# Patient Record
Sex: MALE | Race: BLACK OR AFRICAN AMERICAN | NOT HISPANIC OR LATINO | URBAN - METROPOLITAN AREA
[De-identification: names, ages, dates, MRNs, and addresses within clinical notes are randomized per-mention and may not be internally consistent; named-entity substitution may affect disease eponyms.]

---

## 2025-01-01 ENCOUNTER — APPOINTMENT (OUTPATIENT)
Dept: RADIOLOGY | Facility: HOSPITAL | Age: 75
End: 2025-01-01
Payer: MEDICARE

## 2025-01-01 ENCOUNTER — CLINICAL SUPPORT (OUTPATIENT)
Dept: EMERGENCY MEDICINE | Facility: HOSPITAL | Age: 75
End: 2025-01-01
Payer: MEDICARE

## 2025-01-01 ENCOUNTER — HOSPITAL ENCOUNTER (INPATIENT)
Facility: HOSPITAL | Age: 75
LOS: 5 days | End: 2025-05-03
Attending: EMERGENCY MEDICINE | Admitting: PEDIATRICS
Payer: MEDICARE

## 2025-01-01 ENCOUNTER — APPOINTMENT (OUTPATIENT)
Dept: CARDIOLOGY | Facility: HOSPITAL | Age: 75
End: 2025-01-01
Payer: MEDICARE

## 2025-01-01 ENCOUNTER — APPOINTMENT (OUTPATIENT)
Dept: NEUROLOGY | Facility: HOSPITAL | Age: 75
End: 2025-01-01
Payer: MEDICARE

## 2025-01-01 VITALS
WEIGHT: 124.12 LBS | OXYGEN SATURATION: 100 % | SYSTOLIC BLOOD PRESSURE: 101 MMHG | HEIGHT: 69 IN | RESPIRATION RATE: 7 BRPM | BODY MASS INDEX: 18.38 KG/M2 | HEART RATE: 81 BPM | DIASTOLIC BLOOD PRESSURE: 48 MMHG | TEMPERATURE: 95.9 F

## 2025-01-01 DIAGNOSIS — I46.9 CARDIAC ARREST: Primary | ICD-10-CM

## 2025-01-01 LAB
ABO GROUP (TYPE) IN BLOOD: NORMAL
ABO GROUP (TYPE) IN BLOOD: NORMAL
ACANTHOCYTES BLD QL SMEAR: ABNORMAL
ACANTHOCYTES BLD QL SMEAR: NORMAL
ACID FAST STN SPEC: NORMAL
ALBUMIN SERPL BCP-MCNC: 1.8 G/DL (ref 3.4–5)
ALBUMIN SERPL BCP-MCNC: 2 G/DL (ref 3.4–5)
ALBUMIN SERPL BCP-MCNC: 2.1 G/DL (ref 3.4–5)
ALBUMIN SERPL BCP-MCNC: 2.2 G/DL (ref 3.4–5)
ALBUMIN SERPL BCP-MCNC: 2.2 G/DL (ref 3.4–5)
ALBUMIN SERPL BCP-MCNC: 2.4 G/DL (ref 3.4–5)
ALBUMIN SERPL BCP-MCNC: 2.6 G/DL (ref 3.4–5)
ALP SERPL-CCNC: 49 U/L (ref 33–136)
ALP SERPL-CCNC: 74 U/L (ref 33–136)
ALT SERPL W P-5'-P-CCNC: 120 U/L (ref 10–52)
ALT SERPL W P-5'-P-CCNC: 24 U/L (ref 10–52)
AMPHETAMINES UR QL SCN: ABNORMAL
ANION GAP BLDA CALCULATED.4IONS-SCNC: 10 MMO/L (ref 10–25)
ANION GAP BLDA CALCULATED.4IONS-SCNC: 11 MMO/L (ref 10–25)
ANION GAP BLDA CALCULATED.4IONS-SCNC: 12 MMO/L (ref 10–25)
ANION GAP BLDA CALCULATED.4IONS-SCNC: 13 MMO/L (ref 10–25)
ANION GAP BLDA CALCULATED.4IONS-SCNC: 13 MMO/L (ref 10–25)
ANION GAP BLDA CALCULATED.4IONS-SCNC: 18 MMO/L (ref 10–25)
ANION GAP BLDV CALCULATED.4IONS-SCNC: 11 MMOL/L (ref 10–25)
ANION GAP BLDV CALCULATED.4IONS-SCNC: 17 MMOL/L (ref 10–25)
ANION GAP BLDV CALCULATED.4IONS-SCNC: 19 MMOL/L (ref 10–25)
ANION GAP BLDV CALCULATED.4IONS-SCNC: 9 MMOL/L (ref 10–25)
ANION GAP SERPL CALC-SCNC: 12 MMOL/L (ref 10–20)
ANION GAP SERPL CALC-SCNC: 13 MMOL/L
ANION GAP SERPL CALC-SCNC: 13 MMOL/L (ref 10–20)
ANION GAP SERPL CALC-SCNC: 14 MMOL/L
ANION GAP SERPL CALC-SCNC: 14 MMOL/L (ref 10–20)
ANION GAP SERPL CALC-SCNC: 15 MMOL/L (ref 10–20)
ANION GAP SERPL CALC-SCNC: 19 MMOL/L (ref 10–20)
ANION GAP SERPL CALC-SCNC: 26 MMOL/L (ref 10–20)
ANTIBODY SCREEN: NORMAL
ANTIBODY SCREEN: NORMAL
APPEARANCE UR: ABNORMAL
APTT PPP: 31 SECONDS (ref 26–36)
APTT PPP: 50 SECONDS (ref 26–36)
AST SERPL W P-5'-P-CCNC: 36 U/L (ref 9–39)
AST SERPL W P-5'-P-CCNC: 405 U/L (ref 9–39)
ATRIAL RATE: 103 BPM
BACTERIA BLD CULT: NORMAL
BACTERIA BLD CULT: NORMAL
BACTERIA SPEC RESP CULT: ABNORMAL
BACTERIA UR CULT: NO GROWTH
BARBITURATES UR QL SCN: ABNORMAL
BASE EXCESS BLDA CALC-SCNC: -0.7 MMOL/L (ref -2–3)
BASE EXCESS BLDA CALC-SCNC: -2.7 MMOL/L (ref -2–3)
BASE EXCESS BLDA CALC-SCNC: -3.2 MMOL/L (ref -2–3)
BASE EXCESS BLDA CALC-SCNC: -5 MMOL/L (ref -2–3)
BASE EXCESS BLDA CALC-SCNC: -5.7 MMOL/L (ref -2–3)
BASE EXCESS BLDA CALC-SCNC: -5.7 MMOL/L (ref -2–3)
BASE EXCESS BLDA CALC-SCNC: -7.1 MMOL/L (ref -2–3)
BASE EXCESS BLDA CALC-SCNC: -8.4 MMOL/L (ref -2–3)
BASE EXCESS BLDV CALC-SCNC: -2 MMOL/L (ref -2–3)
BASE EXCESS BLDV CALC-SCNC: -3.9 MMOL/L (ref -2–3)
BASE EXCESS BLDV CALC-SCNC: -7 MMOL/L (ref -2–3)
BASE EXCESS BLDV CALC-SCNC: -7.6 MMOL/L (ref -2–3)
BASO STIPL BLD QL SMEAR: PRESENT
BASO STIPL BLD QL SMEAR: PRESENT
BASOPHILS # BLD AUTO: 0.01 X10*3/UL (ref 0–0.1)
BASOPHILS # BLD AUTO: 0.02 X10*3/UL (ref 0–0.1)
BASOPHILS # BLD AUTO: 0.06 X10*3/UL (ref 0–0.1)
BASOPHILS # BLD MANUAL: 0 X10*3/UL (ref 0–0.1)
BASOPHILS NFR BLD AUTO: 0.2 %
BASOPHILS NFR BLD AUTO: 0.2 %
BASOPHILS NFR BLD AUTO: 0.4 %
BASOPHILS NFR BLD MANUAL: 0 %
BENZODIAZ UR QL SCN: ABNORMAL
BILIRUB DIRECT SERPL-MCNC: 0.4 MG/DL (ref 0–0.3)
BILIRUB SERPL-MCNC: 0.8 MG/DL (ref 0–1.2)
BILIRUB SERPL-MCNC: 1.3 MG/DL (ref 0–1.2)
BILIRUB UR STRIP.AUTO-MCNC: NEGATIVE MG/DL
BLASTS # BLD MANUAL: 0 X10*3/UL
BLASTS NFR BLD MANUAL: 0 %
BLOOD EXPIRATION DATE: NORMAL
BODY TEMPERATURE: 37 DEGREES CELSIUS
BUN SERPL-MCNC: 18 MG/DL (ref 6–23)
BUN SERPL-MCNC: 20 MG/DL (ref 6–23)
BUN SERPL-MCNC: 21 MG/DL (ref 6–23)
BUN SERPL-MCNC: 21 MG/DL (ref 6–23)
BUN SERPL-MCNC: 22 MG/DL (ref 6–23)
BUN SERPL-MCNC: 22 MG/DL (ref 6–23)
BUN SERPL-MCNC: 23 MG/DL (ref 6–23)
BUN SERPL-MCNC: 25 MG/DL (ref 6–23)
BUN SERPL-MCNC: 28 MG/DL (ref 6–23)
BUN SERPL-MCNC: 29 MG/DL (ref 6–23)
BURR CELLS BLD QL SMEAR: ABNORMAL
BURR CELLS BLD QL SMEAR: NORMAL
BZE UR QL SCN: ABNORMAL
CA-I BLDA-SCNC: 0.96 MMOL/L (ref 1.1–1.33)
CA-I BLDA-SCNC: 1.01 MMOL/L (ref 1.1–1.33)
CA-I BLDA-SCNC: 1.02 MMOL/L (ref 1.1–1.33)
CA-I BLDA-SCNC: 1.03 MMOL/L (ref 1.1–1.33)
CA-I BLDA-SCNC: 1.16 MMOL/L (ref 1.1–1.33)
CA-I BLDA-SCNC: 1.23 MMOL/L (ref 1.1–1.33)
CA-I BLDA-SCNC: 1.27 MMOL/L (ref 1.1–1.33)
CA-I BLDA-SCNC: 1.35 MMOL/L (ref 1.1–1.33)
CA-I BLDV-SCNC: 1.11 MMOL/L (ref 1.1–1.33)
CA-I BLDV-SCNC: 1.15 MMOL/L (ref 1.1–1.33)
CA-I BLDV-SCNC: 1.24 MMOL/L (ref 1.1–1.33)
CA-I BLDV-SCNC: 1.54 MMOL/L (ref 1.1–1.33)
CALCIUM SERPL-MCNC: 11.5 MG/DL (ref 8.6–10.6)
CALCIUM SERPL-MCNC: 5.9 MG/DL (ref 8.6–10.6)
CALCIUM SERPL-MCNC: 6.1 MG/DL (ref 8.6–10.6)
CALCIUM SERPL-MCNC: 6.5 MG/DL (ref 8.6–10.6)
CALCIUM SERPL-MCNC: 6.9 MG/DL (ref 8.6–10.6)
CALCIUM SERPL-MCNC: 7 MG/DL (ref 8.6–10.6)
CALCIUM SERPL-MCNC: 7.1 MG/DL (ref 8.6–10.6)
CALCIUM SERPL-MCNC: 7.5 MG/DL (ref 8.6–10.6)
CALCIUM SERPL-MCNC: 8 MG/DL (ref 8.6–10.6)
CALCIUM SERPL-MCNC: 9 MG/DL (ref 8.6–10.6)
CANNABINOIDS UR QL SCN: ABNORMAL
CARDIAC TROPONIN I PNL SERPL HS: 149 NG/L (ref 0–53)
CARDIAC TROPONIN I PNL SERPL HS: 236 NG/L (ref 0–53)
CARDIAC TROPONIN I PNL SERPL HS: 334 NG/L (ref 0–53)
CARDIAC TROPONIN I PNL SERPL HS: 362 NG/L (ref 0–53)
CARDIAC TROPONIN I PNL SERPL HS: 88 NG/L (ref 0–53)
CHLORIDE BLDA-SCNC: 100 MMOL/L (ref 98–107)
CHLORIDE BLDA-SCNC: 95 MMOL/L (ref 98–107)
CHLORIDE BLDA-SCNC: 97 MMOL/L (ref 98–107)
CHLORIDE BLDA-SCNC: 97 MMOL/L (ref 98–107)
CHLORIDE BLDA-SCNC: 99 MMOL/L (ref 98–107)
CHLORIDE BLDV-SCNC: 103 MMOL/L (ref 98–107)
CHLORIDE BLDV-SCNC: 94 MMOL/L (ref 98–107)
CHLORIDE BLDV-SCNC: 98 MMOL/L (ref 98–107)
CHLORIDE BLDV-SCNC: 98 MMOL/L (ref 98–107)
CHLORIDE SERPL-SCNC: 100 MMOL/L (ref 98–107)
CHLORIDE SERPL-SCNC: 100 MMOL/L (ref 98–107)
CHLORIDE SERPL-SCNC: 101 MMOL/L (ref 98–107)
CHLORIDE SERPL-SCNC: 102 MMOL/L (ref 98–107)
CHLORIDE SERPL-SCNC: 94 MMOL/L (ref 98–107)
CHLORIDE SERPL-SCNC: 95 MMOL/L (ref 98–107)
CHLORIDE SERPL-SCNC: 96 MMOL/L (ref 98–107)
CHLORIDE SERPL-SCNC: 97 MMOL/L (ref 98–107)
CHLORIDE SERPL-SCNC: 98 MMOL/L (ref 98–107)
CHLORIDE SERPL-SCNC: 99 MMOL/L (ref 98–107)
CHLORIDE UR-SCNC: 46 MMOL/L
CHLORIDE UR-SCNC: 46 MMOL/L
CHLORIDE/CREATININE (MMOL/G) IN URINE: 144 MMOL/G CREAT (ref 23–275)
CHLORIDE/CREATININE (MMOL/G) IN URINE: 160 MMOL/G CREAT (ref 23–275)
CK SERPL-CCNC: 601 U/L (ref 0–325)
CO2 SERPL-SCNC: 16 MMOL/L (ref 21–32)
CO2 SERPL-SCNC: 16 MMOL/L (ref 21–32)
CO2 SERPL-SCNC: 17 MMOL/L (ref 21–32)
CO2 SERPL-SCNC: 18 MMOL/L (ref 21–32)
CO2 SERPL-SCNC: 18 MMOL/L (ref 21–32)
CO2 SERPL-SCNC: 19 MMOL/L (ref 21–32)
CO2 SERPL-SCNC: 20 MMOL/L (ref 21–32)
CO2 SERPL-SCNC: 22 MMOL/L (ref 21–32)
COLOR UR: COLORLESS
CORTIS SERPL-MCNC: 130.2 UG/DL (ref 2.5–20)
CREAT SERPL-MCNC: 0.68 MG/DL (ref 0.5–1.3)
CREAT SERPL-MCNC: 0.82 MG/DL (ref 0.5–1.3)
CREAT SERPL-MCNC: 0.89 MG/DL (ref 0.5–1.3)
CREAT SERPL-MCNC: 1.06 MG/DL (ref 0.5–1.3)
CREAT SERPL-MCNC: 1.09 MG/DL (ref 0.5–1.3)
CREAT SERPL-MCNC: 1.19 MG/DL (ref 0.5–1.3)
CREAT SERPL-MCNC: 1.24 MG/DL (ref 0.5–1.3)
CREAT SERPL-MCNC: 1.35 MG/DL (ref 0.5–1.3)
CREAT SERPL-MCNC: 1.66 MG/DL (ref 0.5–1.3)
CREAT SERPL-MCNC: 1.78 MG/DL (ref 0.5–1.3)
CREAT UR-MCNC: 28.8 MG/DL (ref 20–370)
CREAT UR-MCNC: 31.9 MG/DL (ref 20–370)
DISPENSE STATUS: NORMAL
EGFRCR SERPLBLD CKD-EPI 2021: 39 ML/MIN/1.73M*2
EGFRCR SERPLBLD CKD-EPI 2021: 42 ML/MIN/1.73M*2
EGFRCR SERPLBLD CKD-EPI 2021: 54 ML/MIN/1.73M*2
EGFRCR SERPLBLD CKD-EPI 2021: 60 ML/MIN/1.73M*2
EGFRCR SERPLBLD CKD-EPI 2021: 63 ML/MIN/1.73M*2
EGFRCR SERPLBLD CKD-EPI 2021: 70 ML/MIN/1.73M*2
EGFRCR SERPLBLD CKD-EPI 2021: 73 ML/MIN/1.73M*2
EGFRCR SERPLBLD CKD-EPI 2021: 89 ML/MIN/1.73M*2
EGFRCR SERPLBLD CKD-EPI 2021: >90 ML/MIN/1.73M*2
EGFRCR SERPLBLD CKD-EPI 2021: >90 ML/MIN/1.73M*2
EOSINOPHIL # BLD AUTO: 0 X10*3/UL (ref 0–0.4)
EOSINOPHIL # BLD AUTO: 0.01 X10*3/UL (ref 0–0.4)
EOSINOPHIL # BLD AUTO: 0.01 X10*3/UL (ref 0–0.4)
EOSINOPHIL # BLD MANUAL: 0 X10*3/UL (ref 0–0.4)
EOSINOPHIL # BLD MANUAL: 0 X10*3/UL (ref 0–0.4)
EOSINOPHIL # BLD MANUAL: 0.05 X10*3/UL (ref 0–0.4)
EOSINOPHIL # BLD MANUAL: 0.19 X10*3/UL (ref 0–0.4)
EOSINOPHIL NFR BLD AUTO: 0 %
EOSINOPHIL NFR BLD AUTO: 0.1 %
EOSINOPHIL NFR BLD AUTO: 0.1 %
EOSINOPHIL NFR BLD MANUAL: 0 %
EOSINOPHIL NFR BLD MANUAL: 0 %
EOSINOPHIL NFR BLD MANUAL: 0.4 %
EOSINOPHIL NFR BLD MANUAL: 1.5 %
ERYTHROCYTE [DISTWIDTH] IN BLOOD BY AUTOMATED COUNT: 13.4 % (ref 11.5–14.5)
ERYTHROCYTE [DISTWIDTH] IN BLOOD BY AUTOMATED COUNT: 13.9 % (ref 11.5–14.5)
ERYTHROCYTE [DISTWIDTH] IN BLOOD BY AUTOMATED COUNT: 13.9 % (ref 11.5–14.5)
ERYTHROCYTE [DISTWIDTH] IN BLOOD BY AUTOMATED COUNT: 14.2 % (ref 11.5–14.5)
ERYTHROCYTE [DISTWIDTH] IN BLOOD BY AUTOMATED COUNT: 14.5 % (ref 11.5–14.5)
ERYTHROCYTE [DISTWIDTH] IN BLOOD BY AUTOMATED COUNT: 14.6 % (ref 11.5–14.5)
ERYTHROCYTE [DISTWIDTH] IN BLOOD BY AUTOMATED COUNT: 14.7 % (ref 11.5–14.5)
FENTANYL+NORFENTANYL UR QL SCN: ABNORMAL
FIBRINOGEN PPP-MCNC: 291 MG/DL (ref 200–400)
FIBRINOGEN PPP-MCNC: 399 MG/DL (ref 200–400)
GLUCOSE BLD MANUAL STRIP-MCNC: 100 MG/DL (ref 74–99)
GLUCOSE BLD MANUAL STRIP-MCNC: 107 MG/DL (ref 74–99)
GLUCOSE BLD MANUAL STRIP-MCNC: 107 MG/DL (ref 74–99)
GLUCOSE BLD MANUAL STRIP-MCNC: 108 MG/DL (ref 74–99)
GLUCOSE BLD MANUAL STRIP-MCNC: 110 MG/DL (ref 74–99)
GLUCOSE BLD MANUAL STRIP-MCNC: 114 MG/DL (ref 74–99)
GLUCOSE BLD MANUAL STRIP-MCNC: 124 MG/DL (ref 74–99)
GLUCOSE BLD MANUAL STRIP-MCNC: 125 MG/DL (ref 74–99)
GLUCOSE BLD MANUAL STRIP-MCNC: 127 MG/DL (ref 74–99)
GLUCOSE BLD MANUAL STRIP-MCNC: 128 MG/DL (ref 74–99)
GLUCOSE BLD MANUAL STRIP-MCNC: 128 MG/DL (ref 74–99)
GLUCOSE BLD MANUAL STRIP-MCNC: 135 MG/DL (ref 74–99)
GLUCOSE BLD MANUAL STRIP-MCNC: 138 MG/DL (ref 74–99)
GLUCOSE BLD MANUAL STRIP-MCNC: 14 MG/DL (ref 74–99)
GLUCOSE BLD MANUAL STRIP-MCNC: 14 MG/DL (ref 74–99)
GLUCOSE BLD MANUAL STRIP-MCNC: 149 MG/DL (ref 74–99)
GLUCOSE BLD MANUAL STRIP-MCNC: 153 MG/DL (ref 74–99)
GLUCOSE BLD MANUAL STRIP-MCNC: 154 MG/DL (ref 74–99)
GLUCOSE BLD MANUAL STRIP-MCNC: 156 MG/DL (ref 74–99)
GLUCOSE BLD MANUAL STRIP-MCNC: 177 MG/DL (ref 74–99)
GLUCOSE BLD MANUAL STRIP-MCNC: 181 MG/DL (ref 74–99)
GLUCOSE BLD MANUAL STRIP-MCNC: 21 MG/DL (ref 74–99)
GLUCOSE BLD MANUAL STRIP-MCNC: 21 MG/DL (ref 74–99)
GLUCOSE BLD MANUAL STRIP-MCNC: 225 MG/DL (ref 74–99)
GLUCOSE BLD MANUAL STRIP-MCNC: 238 MG/DL (ref 74–99)
GLUCOSE BLD MANUAL STRIP-MCNC: 269 MG/DL (ref 74–99)
GLUCOSE BLD MANUAL STRIP-MCNC: 27 MG/DL (ref 74–99)
GLUCOSE BLD MANUAL STRIP-MCNC: 285 MG/DL (ref 74–99)
GLUCOSE BLD MANUAL STRIP-MCNC: 292 MG/DL (ref 74–99)
GLUCOSE BLD MANUAL STRIP-MCNC: 49 MG/DL (ref 74–99)
GLUCOSE BLD MANUAL STRIP-MCNC: 64 MG/DL (ref 74–99)
GLUCOSE BLD MANUAL STRIP-MCNC: 75 MG/DL (ref 74–99)
GLUCOSE BLD MANUAL STRIP-MCNC: 84 MG/DL (ref 74–99)
GLUCOSE BLD MANUAL STRIP-MCNC: 89 MG/DL (ref 74–99)
GLUCOSE BLD MANUAL STRIP-MCNC: 93 MG/DL (ref 74–99)
GLUCOSE BLD MANUAL STRIP-MCNC: <10 MG/DL (ref 74–99)
GLUCOSE BLDA-MCNC: 100 MG/DL (ref 74–99)
GLUCOSE BLDA-MCNC: 103 MG/DL (ref 74–99)
GLUCOSE BLDA-MCNC: 111 MG/DL (ref 74–99)
GLUCOSE BLDA-MCNC: 198 MG/DL (ref 74–99)
GLUCOSE BLDA-MCNC: 240 MG/DL (ref 74–99)
GLUCOSE BLDA-MCNC: 269 MG/DL (ref 74–99)
GLUCOSE BLDA-MCNC: 301 MG/DL (ref 74–99)
GLUCOSE BLDA-MCNC: 88 MG/DL (ref 74–99)
GLUCOSE BLDV-MCNC: 118 MG/DL (ref 74–99)
GLUCOSE BLDV-MCNC: 127 MG/DL (ref 74–99)
GLUCOSE BLDV-MCNC: 171 MG/DL (ref 74–99)
GLUCOSE BLDV-MCNC: 263 MG/DL (ref 74–99)
GLUCOSE SERPL-MCNC: 100 MG/DL (ref 74–99)
GLUCOSE SERPL-MCNC: 101 MG/DL (ref 74–99)
GLUCOSE SERPL-MCNC: 112 MG/DL (ref 74–99)
GLUCOSE SERPL-MCNC: 155 MG/DL (ref 74–99)
GLUCOSE SERPL-MCNC: 170 MG/DL (ref 74–99)
GLUCOSE SERPL-MCNC: 225 MG/DL (ref 74–99)
GLUCOSE SERPL-MCNC: 235 MG/DL (ref 74–99)
GLUCOSE SERPL-MCNC: 250 MG/DL (ref 74–99)
GLUCOSE SERPL-MCNC: 81 MG/DL (ref 74–99)
GLUCOSE SERPL-MCNC: 92 MG/DL (ref 74–99)
GLUCOSE UR STRIP.AUTO-MCNC: ABNORMAL MG/DL
GRAM STN SPEC: ABNORMAL
GRAM STN SPEC: ABNORMAL
HAPTOGLOB SERPL NEPH-MCNC: 103 MG/DL (ref 30–200)
HAPTOGLOB SERPL NEPH-MCNC: 119 MG/DL (ref 30–200)
HAPTOGLOB SERPL NEPH-MCNC: 119 MG/DL (ref 30–200)
HCO3 BLDA-SCNC: 14.9 MMOL/L (ref 22–26)
HCO3 BLDA-SCNC: 16.2 MMOL/L (ref 22–26)
HCO3 BLDA-SCNC: 17.1 MMOL/L (ref 22–26)
HCO3 BLDA-SCNC: 18 MMOL/L (ref 22–26)
HCO3 BLDA-SCNC: 18 MMOL/L (ref 22–26)
HCO3 BLDA-SCNC: 18.4 MMOL/L (ref 22–26)
HCO3 BLDA-SCNC: 19.5 MMOL/L (ref 22–26)
HCO3 BLDA-SCNC: 20.9 MMOL/L (ref 22–26)
HCO3 BLDV-SCNC: 18.6 MMOL/L (ref 22–26)
HCO3 BLDV-SCNC: 20.6 MMOL/L (ref 22–26)
HCO3 BLDV-SCNC: 21.7 MMOL/L (ref 22–26)
HCO3 BLDV-SCNC: 23.3 MMOL/L (ref 22–26)
HCT VFR BLD AUTO: 18.7 % (ref 41–52)
HCT VFR BLD AUTO: 22.8 % (ref 41–52)
HCT VFR BLD AUTO: 24.2 % (ref 41–52)
HCT VFR BLD AUTO: 28.5 % (ref 41–52)
HCT VFR BLD AUTO: 28.7 % (ref 41–52)
HCT VFR BLD AUTO: 31.1 % (ref 41–52)
HCT VFR BLD AUTO: 31.3 % (ref 41–52)
HCT VFR BLD EST: 26 % (ref 41–52)
HCT VFR BLD EST: 26 % (ref 41–52)
HCT VFR BLD EST: 28 % (ref 41–52)
HCT VFR BLD EST: 28 % (ref 41–52)
HCT VFR BLD EST: 29 % (ref 41–52)
HCT VFR BLD EST: 29 % (ref 41–52)
HCT VFR BLD EST: 32 % (ref 41–52)
HCT VFR BLD EST: 32 % (ref 41–52)
HCT VFR BLD EST: 33 % (ref 41–52)
HCT VFR BLD EST: 34 % (ref 41–52)
HCT VFR BLD EST: 34 % (ref 41–52)
HCT VFR BLD EST: 38 % (ref 41–52)
HGB BLD-MCNC: 10.3 G/DL (ref 13.5–17.5)
HGB BLD-MCNC: 10.4 G/DL (ref 13.5–17.5)
HGB BLD-MCNC: 10.5 G/DL (ref 13.5–17.5)
HGB BLD-MCNC: 11.4 G/DL (ref 13.5–17.5)
HGB BLD-MCNC: 6.7 G/DL (ref 13.5–17.5)
HGB BLD-MCNC: 8.4 G/DL (ref 13.5–17.5)
HGB BLD-MCNC: 9 G/DL (ref 13.5–17.5)
HGB BLDA-MCNC: 10.5 G/DL (ref 13.5–17.5)
HGB BLDA-MCNC: 10.6 G/DL (ref 13.5–17.5)
HGB BLDA-MCNC: 11 G/DL (ref 13.5–17.5)
HGB BLDA-MCNC: 11.2 G/DL (ref 13.5–17.5)
HGB BLDA-MCNC: 11.4 G/DL (ref 13.5–17.5)
HGB BLDA-MCNC: 9.2 G/DL (ref 13.5–17.5)
HGB BLDA-MCNC: 9.4 G/DL (ref 13.5–17.5)
HGB BLDA-MCNC: 9.8 G/DL (ref 13.5–17.5)
HGB BLDV-MCNC: 12.7 G/DL (ref 13.5–17.5)
HGB BLDV-MCNC: 8.7 G/DL (ref 13.5–17.5)
HGB BLDV-MCNC: 8.7 G/DL (ref 13.5–17.5)
HGB BLDV-MCNC: 9.5 G/DL (ref 13.5–17.5)
HGB RETIC QN: 34 PG (ref 28–38)
HOLD SPECIMEN: NORMAL
HYALINE CASTS #/AREA URNS AUTO: ABNORMAL /LPF
IMM GRANULOCYTES # BLD AUTO: 0.03 X10*3/UL (ref 0–0.5)
IMM GRANULOCYTES # BLD AUTO: 0.05 X10*3/UL (ref 0–0.5)
IMM GRANULOCYTES # BLD AUTO: 0.09 X10*3/UL (ref 0–0.5)
IMM GRANULOCYTES # BLD AUTO: 0.09 X10*3/UL (ref 0–0.5)
IMM GRANULOCYTES # BLD AUTO: 0.11 X10*3/UL (ref 0–0.5)
IMM GRANULOCYTES # BLD AUTO: 0.13 X10*3/UL (ref 0–0.5)
IMM GRANULOCYTES # BLD AUTO: 0.17 X10*3/UL (ref 0–0.5)
IMM GRANULOCYTES NFR BLD AUTO: 0.4 % (ref 0–0.9)
IMM GRANULOCYTES NFR BLD AUTO: 0.6 % (ref 0–0.9)
IMM GRANULOCYTES NFR BLD AUTO: 0.7 % (ref 0–0.9)
IMM GRANULOCYTES NFR BLD AUTO: 0.8 % (ref 0–0.9)
IMM GRANULOCYTES NFR BLD AUTO: 0.9 % (ref 0–0.9)
IMM GRANULOCYTES NFR BLD AUTO: 1.1 % (ref 0–0.9)
IMM GRANULOCYTES NFR BLD AUTO: 4 % (ref 0–0.9)
IMMATURE RETIC FRACTION: 11 %
INHALED O2 CONCENTRATION: 30 %
INHALED O2 CONCENTRATION: 40 %
INR PPP: 1 (ref 0.9–1.1)
INR PPP: 1.1 (ref 0.9–1.1)
KETONES UR STRIP.AUTO-MCNC: ABNORMAL MG/DL
LACTATE BLDA-SCNC: 1.4 MMOL/L (ref 0.4–2)
LACTATE BLDA-SCNC: 1.6 MMOL/L (ref 0.4–2)
LACTATE BLDA-SCNC: 2.3 MMOL/L (ref 0.4–2)
LACTATE BLDA-SCNC: 2.4 MMOL/L (ref 0.4–2)
LACTATE BLDA-SCNC: 3.2 MMOL/L (ref 0.4–2)
LACTATE BLDA-SCNC: 3.2 MMOL/L (ref 0.4–2)
LACTATE BLDA-SCNC: 3.4 MMOL/L (ref 0.4–2)
LACTATE BLDA-SCNC: 3.8 MMOL/L (ref 0.4–2)
LACTATE BLDV-SCNC: 3.1 MMOL/L (ref 0.4–2)
LACTATE BLDV-SCNC: 5.6 MMOL/L (ref 0.4–2)
LACTATE BLDV-SCNC: 5.9 MMOL/L (ref 0.4–2)
LACTATE BLDV-SCNC: 7.3 MMOL/L (ref 0.4–2)
LACTATE SERPL-SCNC: 6.3 MMOL/L (ref 0.4–2)
LACTATE SERPL-SCNC: 7.5 MMOL/L (ref 0.4–2)
LDH SERPL L TO P-CCNC: 267 U/L (ref 84–246)
LDH SERPL L TO P-CCNC: 280 U/L (ref 84–246)
LEFT VENTRICLE INTERNAL DIMENSION DIASTOLE: 3.8 CM (ref 3.5–6)
LEFT VENTRICULAR OUTFLOW TRACT DIAMETER: 1.86 CM
LEGIONELLA AG UR QL: NEGATIVE
LEUKOCYTE ESTERASE UR QL STRIP.AUTO: ABNORMAL
LEVETIRACETAM SERPL-MCNC: 83 UG/ML (ref 10–40)
LYMPHOCYTES # BLD AUTO: 0.61 X10*3/UL (ref 0.8–3)
LYMPHOCYTES # BLD AUTO: 0.82 X10*3/UL (ref 0.8–3)
LYMPHOCYTES # BLD AUTO: 1 X10*3/UL (ref 0.8–3)
LYMPHOCYTES # BLD MANUAL: 0.38 X10*3/UL (ref 0.8–3)
LYMPHOCYTES # BLD MANUAL: 0.69 X10*3/UL (ref 0.8–3)
LYMPHOCYTES # BLD MANUAL: 0.71 X10*3/UL (ref 0.8–3)
LYMPHOCYTES # BLD MANUAL: 0.93 X10*3/UL (ref 0.8–3)
LYMPHOCYTES NFR BLD AUTO: 14.1 %
LYMPHOCYTES NFR BLD AUTO: 5.8 %
LYMPHOCYTES NFR BLD AUTO: 8.7 %
LYMPHOCYTES NFR BLD MANUAL: 21.7 %
LYMPHOCYTES NFR BLD MANUAL: 3.1 %
LYMPHOCYTES NFR BLD MANUAL: 5.5 %
LYMPHOCYTES NFR BLD MANUAL: 6.2 %
MAGNESIUM SERPL-MCNC: 1.62 MG/DL (ref 1.6–2.4)
MAGNESIUM SERPL-MCNC: 1.85 MG/DL (ref 1.6–2.4)
MAGNESIUM SERPL-MCNC: 2.12 MG/DL (ref 1.6–2.4)
MAGNESIUM SERPL-MCNC: 2.33 MG/DL (ref 1.6–2.4)
MAGNESIUM SERPL-MCNC: 2.4 MG/DL (ref 1.6–2.4)
MCH RBC QN AUTO: 29.9 PG (ref 26–34)
MCH RBC QN AUTO: 30.1 PG (ref 26–34)
MCH RBC QN AUTO: 30.4 PG (ref 26–34)
MCH RBC QN AUTO: 30.5 PG (ref 26–34)
MCH RBC QN AUTO: 30.7 PG (ref 26–34)
MCH RBC QN AUTO: 30.8 PG (ref 26–34)
MCH RBC QN AUTO: 31.2 PG (ref 26–34)
MCHC RBC AUTO-ENTMCNC: 33.8 G/DL (ref 32–36)
MCHC RBC AUTO-ENTMCNC: 35.8 G/DL (ref 32–36)
MCHC RBC AUTO-ENTMCNC: 35.9 G/DL (ref 32–36)
MCHC RBC AUTO-ENTMCNC: 36.4 G/DL (ref 32–36)
MCHC RBC AUTO-ENTMCNC: 36.5 G/DL (ref 32–36)
MCHC RBC AUTO-ENTMCNC: 36.8 G/DL (ref 32–36)
MCHC RBC AUTO-ENTMCNC: 37.2 G/DL (ref 32–36)
MCV RBC AUTO: 82 FL (ref 80–100)
MCV RBC AUTO: 83 FL (ref 80–100)
MCV RBC AUTO: 83 FL (ref 80–100)
MCV RBC AUTO: 84 FL (ref 80–100)
MCV RBC AUTO: 85 FL (ref 80–100)
MCV RBC AUTO: 85 FL (ref 80–100)
MCV RBC AUTO: 91 FL (ref 80–100)
METAMYELOCYTES # BLD MANUAL: 0 X10*3/UL
METAMYELOCYTES # BLD MANUAL: 0.05 X10*3/UL
METAMYELOCYTES # BLD MANUAL: 0.09 X10*3/UL
METAMYELOCYTES # BLD MANUAL: 0.29 X10*3/UL
METAMYELOCYTES NFR BLD MANUAL: 0 %
METAMYELOCYTES NFR BLD MANUAL: 0.4 %
METAMYELOCYTES NFR BLD MANUAL: 0.8 %
METAMYELOCYTES NFR BLD MANUAL: 2.3 %
METHADONE UR QL SCN: ABNORMAL
MITRAL VALVE E/A RATIO: 0.79
MONOCYTES # BLD AUTO: 0.29 X10*3/UL (ref 0.05–0.8)
MONOCYTES # BLD AUTO: 0.4 X10*3/UL (ref 0.05–0.8)
MONOCYTES # BLD AUTO: 0.5 X10*3/UL (ref 0.05–0.8)
MONOCYTES # BLD MANUAL: 0 X10*3/UL (ref 0.05–0.8)
MONOCYTES # BLD MANUAL: 0.09 X10*3/UL (ref 0.05–0.8)
MONOCYTES # BLD MANUAL: 0.19 X10*3/UL (ref 0.05–0.8)
MONOCYTES # BLD MANUAL: 0.2 X10*3/UL (ref 0.05–0.8)
MONOCYTES NFR BLD AUTO: 3.5 %
MONOCYTES NFR BLD AUTO: 3.6 %
MONOCYTES NFR BLD AUTO: 6.7 %
MONOCYTES NFR BLD MANUAL: 0 %
MONOCYTES NFR BLD MANUAL: 0.8 %
MONOCYTES NFR BLD MANUAL: 1.6 %
MONOCYTES NFR BLD MANUAL: 4.4 %
MRSA DNA SPEC QL NAA+PROBE: NOT DETECTED
MUCOUS THREADS #/AREA URNS AUTO: ABNORMAL /LPF
MYCOBACTERIUM SPEC CULT: NORMAL
MYELOCYTES # BLD MANUAL: 0 X10*3/UL
MYELOCYTES # BLD MANUAL: 0.1 X10*3/UL
MYELOCYTES NFR BLD MANUAL: 0 %
MYELOCYTES NFR BLD MANUAL: 0.8 %
NEUTROPHILS # BLD AUTO: 10.01 X10*3/UL (ref 1.6–5.5)
NEUTROPHILS # BLD AUTO: 12.56 X10*3/UL (ref 1.6–5.5)
NEUTROPHILS # BLD AUTO: 3.39 X10*3/UL (ref 1.6–5.5)
NEUTROPHILS # BLD MANUAL: 10.61 X10*3/UL (ref 1.6–5.5)
NEUTROPHILS # BLD MANUAL: 11.25 X10*3/UL (ref 1.6–5.5)
NEUTROPHILS # BLD MANUAL: 11.69 X10*3/UL (ref 1.6–5.5)
NEUTROPHILS # BLD MANUAL: 3.17 X10*3/UL (ref 1.6–5.5)
NEUTROPHILS NFR BLD AUTO: 78.3 %
NEUTROPHILS NFR BLD AUTO: 87.1 %
NEUTROPHILS NFR BLD AUTO: 89.5 %
NEUTS BAND # BLD MANUAL: 0.22 X10*3/UL (ref 0–0.5)
NEUTS BAND # BLD MANUAL: 0.58 X10*3/UL (ref 0–0.5)
NEUTS BAND # BLD MANUAL: 2.88 X10*3/UL (ref 0–0.5)
NEUTS BAND # BLD MANUAL: 4.9 X10*3/UL (ref 0–0.5)
NEUTS BAND NFR BLD MANUAL: 25 %
NEUTS BAND NFR BLD MANUAL: 39.5 %
NEUTS BAND NFR BLD MANUAL: 4.6 %
NEUTS BAND NFR BLD MANUAL: 5.2 %
NEUTS SEG # BLD MANUAL: 11.11 X10*3/UL (ref 1.6–5)
NEUTS SEG # BLD MANUAL: 2.95 X10*3/UL (ref 1.6–5)
NEUTS SEG # BLD MANUAL: 6.35 X10*3/UL (ref 1.6–5)
NEUTS SEG # BLD MANUAL: 7.73 X10*3/UL (ref 1.6–5)
NEUTS SEG NFR BLD MANUAL: 51.2 %
NEUTS SEG NFR BLD MANUAL: 67.2 %
NEUTS SEG NFR BLD MANUAL: 68.7 %
NEUTS SEG NFR BLD MANUAL: 88.2 %
NIL(NEG) CONTROL SPOT COUNT: NORMAL
NITRITE UR QL STRIP.AUTO: NEGATIVE
NRBC BLD MANUAL-RTO: 0.9 % (ref 0–0)
NRBC BLD-RTO: 0 /100 WBCS (ref 0–0)
NRBC BLD-RTO: 0.3 /100 WBCS (ref 0–0)
NRBC BLD-RTO: 0.7 /100 WBCS (ref 0–0)
NRBC BLD-RTO: 1 /100 WBCS (ref 0–0)
NRBC BLD-RTO: 1.9 /100 WBCS (ref 0–0)
NRBC BLD-RTO: 2.4 /100 WBCS (ref 0–0)
NRBC BLD-RTO: 2.9 /100 WBCS (ref 0–0)
OPIATES UR QL SCN: ABNORMAL
OSMOLALITY SERPL: 266 MOSM/KG (ref 280–300)
OSMOLALITY SERPL: 266 MOSM/KG (ref 280–300)
OSMOLALITY SERPL: 275 MOSM/KG (ref 280–300)
OSMOLALITY UR: 294 MOSM/KG (ref 200–1200)
OSMOLALITY UR: 313 MOSM/KG (ref 200–1200)
OXYCODONE+OXYMORPHONE UR QL SCN: ABNORMAL
OXYHGB MFR BLDA: 96 % (ref 94–98)
OXYHGB MFR BLDA: 96 % (ref 94–98)
OXYHGB MFR BLDA: 96.1 % (ref 94–98)
OXYHGB MFR BLDA: 97.1 % (ref 94–98)
OXYHGB MFR BLDA: 97.4 % (ref 94–98)
OXYHGB MFR BLDA: 97.5 % (ref 94–98)
OXYHGB MFR BLDA: 97.5 % (ref 94–98)
OXYHGB MFR BLDA: 97.7 % (ref 94–98)
OXYHGB MFR BLDV: 42 % (ref 45–75)
OXYHGB MFR BLDV: 44.7 % (ref 45–75)
OXYHGB MFR BLDV: 50.9 % (ref 45–75)
OXYHGB MFR BLDV: 57.6 % (ref 45–75)
P AXIS: 88 DEGREES
P OFFSET: 200 MS
P ONSET: 137 MS
PANEL A SPOT COUNT: 3
PANEL B SPOT COUNT: 1
PATH REVIEW-CBC DIFFERENTIAL: NORMAL
PCO2 BLDA: 22 MM HG (ref 38–42)
PCO2 BLDA: 23 MM HG (ref 38–42)
PCO2 BLDA: 24 MM HG (ref 38–42)
PCO2 BLDA: 25 MM HG (ref 38–42)
PCO2 BLDA: 29 MM HG (ref 38–42)
PCO2 BLDA: 29 MM HG (ref 38–42)
PCO2 BLDV: 32 MM HG (ref 41–51)
PCO2 BLDV: 34 MM HG (ref 41–51)
PCO2 BLDV: 37 MM HG (ref 41–51)
PCO2 BLDV: 75 MM HG (ref 41–51)
PCP UR QL SCN: ABNORMAL
PH BLDA: 7.4 PH (ref 7.38–7.42)
PH BLDA: 7.42 PH (ref 7.38–7.42)
PH BLDA: 7.48 PH (ref 7.38–7.42)
PH BLDA: 7.5 PH (ref 7.38–7.42)
PH BLDA: 7.53 PH (ref 7.38–7.42)
PH BLDA: 7.53 PH (ref 7.38–7.42)
PH BLDV: 7.1 PH (ref 7.33–7.43)
PH BLDV: 7.31 PH (ref 7.33–7.43)
PH BLDV: 7.39 PH (ref 7.33–7.43)
PH BLDV: 7.44 PH (ref 7.33–7.43)
PH UR STRIP.AUTO: 6 [PH]
PHOSPHATE SERPL-MCNC: 2.6 MG/DL (ref 2.5–4.9)
PHOSPHATE SERPL-MCNC: 2.8 MG/DL (ref 2.5–4.9)
PHOSPHATE SERPL-MCNC: 2.8 MG/DL (ref 2.5–4.9)
PHOSPHATE SERPL-MCNC: 3 MG/DL (ref 2.5–4.9)
PHOSPHATE SERPL-MCNC: 3.2 MG/DL (ref 2.5–4.9)
PHOSPHATE SERPL-MCNC: 3.8 MG/DL (ref 2.5–4.9)
PHOSPHATE SERPL-MCNC: 3.9 MG/DL (ref 2.5–4.9)
PHOSPHATE SERPL-MCNC: 4.2 MG/DL (ref 2.5–4.9)
PHOSPHATE SERPL-MCNC: 4.3 MG/DL (ref 2.5–4.9)
PHOSPHATE SERPL-MCNC: 6.9 MG/DL (ref 2.5–4.9)
PLASMA CELLS # BLD MANUAL: 0 X10*3/UL
PLASMA CELLS NFR BLD MANUAL: 0 %
PLATELET # BLD AUTO: 115 X10*3/UL (ref 150–450)
PLATELET # BLD AUTO: 139 X10*3/UL (ref 150–450)
PLATELET # BLD AUTO: 160 X10*3/UL (ref 150–450)
PLATELET # BLD AUTO: 44 X10*3/UL (ref 150–450)
PLATELET # BLD AUTO: 55 X10*3/UL (ref 150–450)
PLATELET # BLD AUTO: 57 X10*3/UL (ref 150–450)
PLATELET # BLD AUTO: 92 X10*3/UL (ref 150–450)
PO2 BLDA: 138 MM HG (ref 85–95)
PO2 BLDA: 140 MM HG (ref 85–95)
PO2 BLDA: 145 MM HG (ref 85–95)
PO2 BLDA: 146 MM HG (ref 85–95)
PO2 BLDA: 149 MM HG (ref 85–95)
PO2 BLDA: 95 MM HG (ref 85–95)
PO2 BLDA: 96 MM HG (ref 85–95)
PO2 BLDA: 96 MM HG (ref 85–95)
PO2 BLDV: 34 MM HG (ref 35–45)
PO2 BLDV: 36 MM HG (ref 35–45)
PO2 BLDV: 41 MM HG (ref 35–45)
PO2 BLDV: 42 MM HG (ref 35–45)
POS CONTROL SPOT COUNT: NORMAL
POTASSIUM BLDA-SCNC: 3 MMOL/L (ref 3.5–5.3)
POTASSIUM BLDA-SCNC: 3.7 MMOL/L (ref 3.5–5.3)
POTASSIUM BLDA-SCNC: 3.9 MMOL/L (ref 3.5–5.3)
POTASSIUM BLDA-SCNC: 4.1 MMOL/L (ref 3.5–5.3)
POTASSIUM BLDA-SCNC: 4.6 MMOL/L (ref 3.5–5.3)
POTASSIUM BLDA-SCNC: 4.6 MMOL/L (ref 3.5–5.3)
POTASSIUM BLDA-SCNC: 4.7 MMOL/L (ref 3.5–5.3)
POTASSIUM BLDA-SCNC: 4.9 MMOL/L (ref 3.5–5.3)
POTASSIUM BLDV-SCNC: 2.9 MMOL/L (ref 3.5–5.3)
POTASSIUM BLDV-SCNC: 3.8 MMOL/L (ref 3.5–5.3)
POTASSIUM BLDV-SCNC: 4.6 MMOL/L (ref 3.5–5.3)
POTASSIUM BLDV-SCNC: 4.8 MMOL/L (ref 3.5–5.3)
POTASSIUM SERPL-SCNC: 3.1 MMOL/L (ref 3.5–5.3)
POTASSIUM SERPL-SCNC: 4.1 MMOL/L (ref 3.5–5.3)
POTASSIUM SERPL-SCNC: 4.2 MMOL/L (ref 3.5–5.3)
POTASSIUM SERPL-SCNC: 4.3 MMOL/L (ref 3.5–5.3)
POTASSIUM SERPL-SCNC: 4.4 MMOL/L (ref 3.5–5.3)
POTASSIUM SERPL-SCNC: 4.5 MMOL/L (ref 3.5–5.3)
POTASSIUM SERPL-SCNC: 4.6 MMOL/L (ref 3.5–5.3)
POTASSIUM SERPL-SCNC: 4.7 MMOL/L (ref 3.5–5.3)
POTASSIUM UR-SCNC: 55 MMOL/L
POTASSIUM UR-SCNC: 63 MMOL/L
POTASSIUM/CREAT UR-RTO: 172 MMOL/G CREAT
POTASSIUM/CREAT UR-RTO: 219 MMOL/G CREAT
PR INTERVAL: 160 MS
PRODUCT BLOOD TYPE: 5100
PRODUCT CODE: NORMAL
PROMYELOCYTES # BLD MANUAL: 0 X10*3/UL
PROMYELOCYTES NFR BLD MANUAL: 0 %
PROT SERPL-MCNC: 4.1 G/DL (ref 6.4–8.2)
PROT SERPL-MCNC: 4.8 G/DL (ref 6.4–8.2)
PROT UR STRIP.AUTO-MCNC: ABNORMAL MG/DL
PROTHROMBIN TIME: 11.2 SECONDS (ref 9.8–12.4)
PROTHROMBIN TIME: 12.4 SECONDS (ref 9.8–12.4)
Q ONSET: 217 MS
QRS COUNT: 17 BEATS
QRS DURATION: 102 MS
QT INTERVAL: 366 MS
QTC CALCULATION(BAZETT): 479 MS
QTC FREDERICIA: 438 MS
R AXIS: 73 DEGREES
RBC # BLD AUTO: 2.2 X10*6/UL (ref 4.5–5.9)
RBC # BLD AUTO: 2.69 X10*6/UL (ref 4.5–5.9)
RBC # BLD AUTO: 2.96 X10*6/UL (ref 4.5–5.9)
RBC # BLD AUTO: 3.38 X10*6/UL (ref 4.5–5.9)
RBC # BLD AUTO: 3.42 X10*6/UL (ref 4.5–5.9)
RBC # BLD AUTO: 3.44 X10*6/UL (ref 4.5–5.9)
RBC # BLD AUTO: 3.79 X10*6/UL (ref 4.5–5.9)
RBC # UR STRIP.AUTO: ABNORMAL MG/DL
RBC #/AREA URNS AUTO: >20 /HPF
RBC MORPH BLD: ABNORMAL
RBC MORPH BLD: NORMAL
RETICS #: 0.04 X10*6/UL (ref 0.02–0.11)
RETICS/RBC NFR AUTO: 1.5 % (ref 0.5–2)
RH FACTOR (ANTIGEN D): NORMAL
RH FACTOR (ANTIGEN D): NORMAL
RIGHT VENTRICLE FREE WALL PEAK S': 9 CM/S
RIGHT VENTRICLE PEAK SYSTOLIC PRESSURE: 36.4 MMHG
S PNEUM AG UR QL: NEGATIVE
SAO2 % BLDA: 100 % (ref 94–100)
SAO2 % BLDA: 99 % (ref 94–100)
SAO2 % BLDV: 43 % (ref 45–75)
SAO2 % BLDV: 46 % (ref 45–75)
SAO2 % BLDV: 52 % (ref 45–75)
SAO2 % BLDV: 59 % (ref 45–75)
SCHISTOCYTES BLD QL SMEAR: ABNORMAL
SCHISTOCYTES BLD QL SMEAR: NORMAL
SODIUM BLDA-SCNC: 118 MMOL/L (ref 136–145)
SODIUM BLDA-SCNC: 120 MMOL/L (ref 136–145)
SODIUM BLDA-SCNC: 122 MMOL/L (ref 136–145)
SODIUM BLDA-SCNC: 123 MMOL/L (ref 136–145)
SODIUM BLDA-SCNC: 125 MMOL/L (ref 136–145)
SODIUM BLDA-SCNC: 128 MMOL/L (ref 136–145)
SODIUM BLDA-SCNC: 128 MMOL/L (ref 136–145)
SODIUM BLDA-SCNC: 131 MMOL/L (ref 136–145)
SODIUM BLDV-SCNC: 120 MMOL/L (ref 136–145)
SODIUM BLDV-SCNC: 125 MMOL/L (ref 136–145)
SODIUM BLDV-SCNC: 136 MMOL/L (ref 136–145)
SODIUM BLDV-SCNC: 136 MMOL/L (ref 136–145)
SODIUM SERPL-SCNC: 122 MMOL/L (ref 136–145)
SODIUM SERPL-SCNC: 123 MMOL/L (ref 136–145)
SODIUM SERPL-SCNC: 123 MMOL/L (ref 136–145)
SODIUM SERPL-SCNC: 124 MMOL/L (ref 136–145)
SODIUM SERPL-SCNC: 127 MMOL/L (ref 136–145)
SODIUM SERPL-SCNC: 128 MMOL/L (ref 136–145)
SODIUM SERPL-SCNC: 135 MMOL/L (ref 136–145)
SODIUM SERPL-SCNC: 140 MMOL/L (ref 136–145)
SODIUM UR-SCNC: 19 MMOL/L
SODIUM UR-SCNC: 21 MMOL/L
SODIUM/CREAT UR-RTO: 60 MMOL/G CREAT
SODIUM/CREAT UR-RTO: 73 MMOL/G CREAT
SP GR UR STRIP.AUTO: 1.02
SPECIMEN DRAWN FROM PATIENT: ABNORMAL
SQUAMOUS #/AREA URNS AUTO: ABNORMAL /HPF
T AXIS: 123 DEGREES
T OFFSET: 400 MS
T-SPOT. TB INTERPRETATION: NEGATIVE
T4 FREE SERPL-MCNC: 1.09 NG/DL (ref 0.78–1.48)
TOTAL CELLS COUNTED BLD: 115
TOTAL CELLS COUNTED BLD: 128
TOTAL CELLS COUNTED BLD: 129
TOTAL CELLS COUNTED BLD: 237
TRICUSPID ANNULAR PLANE SYSTOLIC EXCURSION: 1.9 CM
TSH SERPL-ACNC: 6.47 MIU/L (ref 0.44–3.98)
UNIT ABO: NORMAL
UNIT NUMBER: NORMAL
UNIT RH: NORMAL
UNIT VOLUME: 350
UROBILINOGEN UR STRIP.AUTO-MCNC: NORMAL MG/DL
VANCOMYCIN SERPL-MCNC: 10 UG/ML (ref 5–20)
VARIANT LYMPHS # BLD MANUAL: 0 X10*3/UL (ref 0–0.3)
VARIANT LYMPHS # BLD MANUAL: 0.11 X10*3/UL (ref 0–0.3)
VARIANT LYMPHS NFR BLD: 0 %
VARIANT LYMPHS NFR BLD: 0.9 %
VENTRICULAR RATE: 103 BPM
VIT B12 SERPL-MCNC: 561 PG/ML (ref 211–911)
WBC # BLD AUTO: 11.5 X10*3/UL (ref 4.4–11.3)
WBC # BLD AUTO: 11.5 X10*3/UL (ref 4.4–11.3)
WBC # BLD AUTO: 12.4 X10*3/UL (ref 4.4–11.3)
WBC # BLD AUTO: 12.6 X10*3/UL (ref 4.4–11.3)
WBC # BLD AUTO: 14 X10*3/UL (ref 4.4–11.3)
WBC # BLD AUTO: 4.3 X10*3/UL (ref 4.4–11.3)
WBC # BLD AUTO: 4.3 X10*3/UL (ref 4.4–11.3)
WBC #/AREA URNS AUTO: >50 /HPF
XM INTEP: NORMAL

## 2025-01-01 PROCEDURE — 84132 ASSAY OF SERUM POTASSIUM: CPT

## 2025-01-01 PROCEDURE — 94003 VENT MGMT INPAT SUBQ DAY: CPT

## 2025-01-01 PROCEDURE — 95715 VEEG EA 12-26HR INTMT MNTR: CPT

## 2025-01-01 PROCEDURE — 85025 COMPLETE CBC W/AUTO DIFF WBC: CPT

## 2025-01-01 PROCEDURE — 82947 ASSAY GLUCOSE BLOOD QUANT: CPT

## 2025-01-01 PROCEDURE — 37799 UNLISTED PX VASCULAR SURGERY: CPT

## 2025-01-01 PROCEDURE — 74018 RADEX ABDOMEN 1 VIEW: CPT

## 2025-01-01 PROCEDURE — 82435 ASSAY OF BLOOD CHLORIDE: CPT

## 2025-01-01 PROCEDURE — 36415 COLL VENOUS BLD VENIPUNCTURE: CPT

## 2025-01-01 PROCEDURE — 2500000002 HC RX 250 W HCPCS SELF ADMINISTERED DRUGS (ALT 637 FOR MEDICARE OP, ALT 636 FOR OP/ED)

## 2025-01-01 PROCEDURE — 2500000005 HC RX 250 GENERAL PHARMACY W/O HCPCS

## 2025-01-01 PROCEDURE — 80069 RENAL FUNCTION PANEL: CPT

## 2025-01-01 PROCEDURE — 99221 1ST HOSP IP/OBS SF/LOW 40: CPT | Performed by: NEUROLOGICAL SURGERY

## 2025-01-01 PROCEDURE — 84484 ASSAY OF TROPONIN QUANT: CPT

## 2025-01-01 PROCEDURE — 94681 O2 UPTK CO2 OUTP % O2 XTRC: CPT

## 2025-01-01 PROCEDURE — 80202 ASSAY OF VANCOMYCIN: CPT

## 2025-01-01 PROCEDURE — 2500000004 HC RX 250 GENERAL PHARMACY W/ HCPCS (ALT 636 FOR OP/ED): Mod: JZ

## 2025-01-01 PROCEDURE — 84443 ASSAY THYROID STIM HORMONE: CPT

## 2025-01-01 PROCEDURE — 2020000001 HC ICU ROOM DAILY

## 2025-01-01 PROCEDURE — 83735 ASSAY OF MAGNESIUM: CPT | Performed by: EMERGENCY MEDICINE

## 2025-01-01 PROCEDURE — 85045 AUTOMATED RETICULOCYTE COUNT: CPT

## 2025-01-01 PROCEDURE — 82607 VITAMIN B-12: CPT

## 2025-01-01 PROCEDURE — 87641 MR-STAPH DNA AMP PROBE: CPT

## 2025-01-01 PROCEDURE — 0BH17EZ INSERTION OF ENDOTRACHEAL AIRWAY INTO TRACHEA, VIA NATURAL OR ARTIFICIAL OPENING: ICD-10-PCS

## 2025-01-01 PROCEDURE — 83735 ASSAY OF MAGNESIUM: CPT

## 2025-01-01 PROCEDURE — 3E033XZ INTRODUCTION OF VASOPRESSOR INTO PERIPHERAL VEIN, PERCUTANEOUS APPROACH: ICD-10-PCS

## 2025-01-01 PROCEDURE — 85007 BL SMEAR W/DIFF WBC COUNT: CPT

## 2025-01-01 PROCEDURE — 83930 ASSAY OF BLOOD OSMOLALITY: CPT

## 2025-01-01 PROCEDURE — 5A1955Z RESPIRATORY VENTILATION, GREATER THAN 96 CONSECUTIVE HOURS: ICD-10-PCS

## 2025-01-01 PROCEDURE — 85384 FIBRINOGEN ACTIVITY: CPT

## 2025-01-01 PROCEDURE — 82550 ASSAY OF CK (CPK): CPT

## 2025-01-01 PROCEDURE — 86481 TB AG RESPONSE T-CELL SUSP: CPT

## 2025-01-01 PROCEDURE — 36430 TRANSFUSION BLD/BLD COMPNT: CPT

## 2025-01-01 PROCEDURE — 71045 X-RAY EXAM CHEST 1 VIEW: CPT | Performed by: RADIOLOGY

## 2025-01-01 PROCEDURE — 81001 URINALYSIS AUTO W/SCOPE: CPT

## 2025-01-01 PROCEDURE — 5A12012 PERFORMANCE OF CARDIAC OUTPUT, SINGLE, MANUAL: ICD-10-PCS

## 2025-01-01 PROCEDURE — 85027 COMPLETE CBC AUTOMATED: CPT | Performed by: EMERGENCY MEDICINE

## 2025-01-01 PROCEDURE — 2500000005 HC RX 250 GENERAL PHARMACY W/O HCPCS: Performed by: STUDENT IN AN ORGANIZED HEALTH CARE EDUCATION/TRAINING PROGRAM

## 2025-01-01 PROCEDURE — 71275 CT ANGIOGRAPHY CHEST: CPT

## 2025-01-01 PROCEDURE — 94002 VENT MGMT INPAT INIT DAY: CPT

## 2025-01-01 PROCEDURE — 36556 INSERT NON-TUNNEL CV CATH: CPT | Mod: GC | Performed by: STUDENT IN AN ORGANIZED HEALTH CARE EDUCATION/TRAINING PROGRAM

## 2025-01-01 PROCEDURE — 86850 RBC ANTIBODY SCREEN: CPT

## 2025-01-01 PROCEDURE — 87449 NOS EACH ORGANISM AG IA: CPT

## 2025-01-01 PROCEDURE — 95720 EEG PHY/QHP EA INCR W/VEEG: CPT | Performed by: STUDENT IN AN ORGANIZED HEALTH CARE EDUCATION/TRAINING PROGRAM

## 2025-01-01 PROCEDURE — 94799 UNLISTED PULMONARY SVC/PX: CPT

## 2025-01-01 PROCEDURE — 84075 ASSAY ALKALINE PHOSPHATASE: CPT | Performed by: EMERGENCY MEDICINE

## 2025-01-01 PROCEDURE — 87205 SMEAR GRAM STAIN: CPT

## 2025-01-01 PROCEDURE — 2500000004 HC RX 250 GENERAL PHARMACY W/ HCPCS (ALT 636 FOR OP/ED): Performed by: STUDENT IN AN ORGANIZED HEALTH CARE EDUCATION/TRAINING PROGRAM

## 2025-01-01 PROCEDURE — 70450 CT HEAD/BRAIN W/O DYE: CPT

## 2025-01-01 PROCEDURE — 99291 CRITICAL CARE FIRST HOUR: CPT

## 2025-01-01 PROCEDURE — 71045 X-RAY EXAM CHEST 1 VIEW: CPT

## 2025-01-01 PROCEDURE — 83605 ASSAY OF LACTIC ACID: CPT | Performed by: EMERGENCY MEDICINE

## 2025-01-01 PROCEDURE — 82570 ASSAY OF URINE CREATININE: CPT

## 2025-01-01 PROCEDURE — 51702 INSERT TEMP BLADDER CATH: CPT

## 2025-01-01 PROCEDURE — 84132 ASSAY OF SERUM POTASSIUM: CPT | Performed by: EMERGENCY MEDICINE

## 2025-01-01 PROCEDURE — 84133 ASSAY OF URINE POTASSIUM: CPT

## 2025-01-01 PROCEDURE — 84439 ASSAY OF FREE THYROXINE: CPT

## 2025-01-01 PROCEDURE — 83010 ASSAY OF HAPTOGLOBIN QUANT: CPT

## 2025-01-01 PROCEDURE — 70553 MRI BRAIN STEM W/O & W/DYE: CPT | Performed by: RADIOLOGY

## 2025-01-01 PROCEDURE — 84100 ASSAY OF PHOSPHORUS: CPT | Performed by: EMERGENCY MEDICINE

## 2025-01-01 PROCEDURE — 85730 THROMBOPLASTIN TIME PARTIAL: CPT

## 2025-01-01 PROCEDURE — 83615 LACTATE (LD) (LDH) ENZYME: CPT

## 2025-01-01 PROCEDURE — 86901 BLOOD TYPING SEROLOGIC RH(D): CPT

## 2025-01-01 PROCEDURE — 93005 ELECTROCARDIOGRAM TRACING: CPT

## 2025-01-01 PROCEDURE — 71275 CT ANGIOGRAPHY CHEST: CPT | Performed by: RADIOLOGY

## 2025-01-01 PROCEDURE — 80177 DRUG SCRN QUAN LEVETIRACETAM: CPT

## 2025-01-01 PROCEDURE — 84484 ASSAY OF TROPONIN QUANT: CPT | Performed by: EMERGENCY MEDICINE

## 2025-01-01 PROCEDURE — 85007 BL SMEAR W/DIFF WBC COUNT: CPT | Performed by: NURSE PRACTITIONER

## 2025-01-01 PROCEDURE — P9045 ALBUMIN (HUMAN), 5%, 250 ML: HCPCS | Mod: JZ,TB

## 2025-01-01 PROCEDURE — 82040 ASSAY OF SERUM ALBUMIN: CPT

## 2025-01-01 PROCEDURE — 85027 COMPLETE CBC AUTOMATED: CPT

## 2025-01-01 PROCEDURE — P9016 RBC LEUKOCYTES REDUCED: HCPCS

## 2025-01-01 PROCEDURE — 02HV33Z INSERTION OF INFUSION DEVICE INTO SUPERIOR VENA CAVA, PERCUTANEOUS APPROACH: ICD-10-PCS | Performed by: STUDENT IN AN ORGANIZED HEALTH CARE EDUCATION/TRAINING PROGRAM

## 2025-01-01 PROCEDURE — 70450 CT HEAD/BRAIN W/O DYE: CPT | Performed by: RADIOLOGY

## 2025-01-01 PROCEDURE — 87015 SPECIMEN INFECT AGNT CONCNTJ: CPT

## 2025-01-01 PROCEDURE — 74018 RADEX ABDOMEN 1 VIEW: CPT | Performed by: RADIOLOGY

## 2025-01-01 PROCEDURE — 80069 RENAL FUNCTION PANEL: CPT | Performed by: NURSE PRACTITIONER

## 2025-01-01 PROCEDURE — 74177 CT ABD & PELVIS W/CONTRAST: CPT | Performed by: RADIOLOGY

## 2025-01-01 PROCEDURE — C8929 TTE W OR WO FOL WCON,DOPPLER: HCPCS

## 2025-01-01 PROCEDURE — 85610 PROTHROMBIN TIME: CPT

## 2025-01-01 PROCEDURE — 99292 CRITICAL CARE ADDL 30 MIN: CPT

## 2025-01-01 PROCEDURE — 82533 TOTAL CORTISOL: CPT

## 2025-01-01 PROCEDURE — 85007 BL SMEAR W/DIFF WBC COUNT: CPT | Performed by: EMERGENCY MEDICINE

## 2025-01-01 PROCEDURE — 83935 ASSAY OF URINE OSMOLALITY: CPT

## 2025-01-01 PROCEDURE — 2500000004 HC RX 250 GENERAL PHARMACY W/ HCPCS (ALT 636 FOR OP/ED)

## 2025-01-01 PROCEDURE — 80051 ELECTROLYTE PANEL: CPT

## 2025-01-01 PROCEDURE — 95700 EEG CONT REC W/VID EEG TECH: CPT

## 2025-01-01 PROCEDURE — 2500000004 HC RX 250 GENERAL PHARMACY W/ HCPCS (ALT 636 FOR OP/ED): Mod: JZ | Performed by: NURSE PRACTITIONER

## 2025-01-01 PROCEDURE — 31500 INSERT EMERGENCY AIRWAY: CPT

## 2025-01-01 PROCEDURE — 85027 COMPLETE CBC AUTOMATED: CPT | Performed by: NURSE PRACTITIONER

## 2025-01-01 PROCEDURE — 87206 SMEAR FLUORESCENT/ACID STAI: CPT

## 2025-01-01 PROCEDURE — 2550000001 HC RX 255 CONTRASTS: Performed by: EMERGENCY MEDICINE

## 2025-01-01 PROCEDURE — 96366 THER/PROPH/DIAG IV INF ADDON: CPT | Mod: 59

## 2025-01-01 PROCEDURE — 96365 THER/PROPH/DIAG IV INF INIT: CPT | Mod: 59

## 2025-01-01 PROCEDURE — 84100 ASSAY OF PHOSPHORUS: CPT | Performed by: NURSE PRACTITIONER

## 2025-01-01 PROCEDURE — 82436 ASSAY OF URINE CHLORIDE: CPT

## 2025-01-01 PROCEDURE — 3E0G76Z INTRODUCTION OF NUTRITIONAL SUBSTANCE INTO UPPER GI, VIA NATURAL OR ARTIFICIAL OPENING: ICD-10-PCS

## 2025-01-01 PROCEDURE — 87040 BLOOD CULTURE FOR BACTERIA: CPT

## 2025-01-01 PROCEDURE — 87086 URINE CULTURE/COLONY COUNT: CPT

## 2025-01-01 PROCEDURE — 36556 INSERT NON-TUNNEL CV CATH: CPT | Performed by: STUDENT IN AN ORGANIZED HEALTH CARE EDUCATION/TRAINING PROGRAM

## 2025-01-01 PROCEDURE — 85018 HEMOGLOBIN: CPT

## 2025-01-01 PROCEDURE — 86923 COMPATIBILITY TEST ELECTRIC: CPT

## 2025-01-01 PROCEDURE — 2500000004 HC RX 250 GENERAL PHARMACY W/ HCPCS (ALT 636 FOR OP/ED): Mod: JZ | Performed by: EMERGENCY MEDICINE

## 2025-01-01 PROCEDURE — 92950 HEART/LUNG RESUSCITATION CPR: CPT

## 2025-01-01 PROCEDURE — 36620 INSERTION CATHETER ARTERY: CPT

## 2025-01-01 PROCEDURE — A9575 INJ GADOTERATE MEGLUMI 0.1ML: HCPCS | Performed by: INTERNAL MEDICINE

## 2025-01-01 PROCEDURE — 70553 MRI BRAIN STEM W/O & W/DYE: CPT

## 2025-01-01 PROCEDURE — 2500000004 HC RX 250 GENERAL PHARMACY W/ HCPCS (ALT 636 FOR OP/ED): Performed by: NURSE PRACTITIONER

## 2025-01-01 PROCEDURE — 2500000004 HC RX 250 GENERAL PHARMACY W/ HCPCS (ALT 636 FOR OP/ED): Performed by: EMERGENCY MEDICINE

## 2025-01-01 PROCEDURE — 82248 BILIRUBIN DIRECT: CPT

## 2025-01-01 PROCEDURE — 99292 CRITICAL CARE ADDL 30 MIN: CPT | Performed by: EMERGENCY MEDICINE

## 2025-01-01 PROCEDURE — 37799 UNLISTED PX VASCULAR SURGERY: CPT | Performed by: NURSE PRACTITIONER

## 2025-01-01 PROCEDURE — 99291 CRITICAL CARE FIRST HOUR: CPT | Mod: 25 | Performed by: EMERGENCY MEDICINE

## 2025-01-01 PROCEDURE — 93306 TTE W/DOPPLER COMPLETE: CPT | Performed by: INTERNAL MEDICINE

## 2025-01-01 PROCEDURE — 82330 ASSAY OF CALCIUM: CPT

## 2025-01-01 PROCEDURE — 74177 CT ABD & PELVIS W/CONTRAST: CPT

## 2025-01-01 PROCEDURE — 80307 DRUG TEST PRSMV CHEM ANLYZR: CPT

## 2025-01-01 PROCEDURE — 2550000001 HC RX 255 CONTRASTS: Performed by: INTERNAL MEDICINE

## 2025-01-01 PROCEDURE — 87075 CULTR BACTERIA EXCEPT BLOOD: CPT

## 2025-01-01 PROCEDURE — 87899 AGENT NOS ASSAY W/OPTIC: CPT

## 2025-01-01 RX ORDER — VANCOMYCIN HYDROCHLORIDE 1 G/200ML
1000 INJECTION, SOLUTION INTRAVENOUS EVERY 24 HOURS
Status: DISCONTINUED | OUTPATIENT
Start: 2025-01-01 | End: 2025-01-01

## 2025-01-01 RX ORDER — VANCOMYCIN HYDROCHLORIDE 1 G/200ML
1000 INJECTION, SOLUTION INTRAVENOUS ONCE
Status: COMPLETED | OUTPATIENT
Start: 2025-01-01 | End: 2025-01-01

## 2025-01-01 RX ORDER — NOREPINEPHRINE BITARTRATE/D5W 8 MG/250ML
0-.2 PLASTIC BAG, INJECTION (ML) INTRAVENOUS CONTINUOUS
Status: DISCONTINUED | OUTPATIENT
Start: 2025-01-01 | End: 2025-01-01

## 2025-01-01 RX ORDER — ENOXAPARIN SODIUM 100 MG/ML
40 INJECTION SUBCUTANEOUS EVERY 24 HOURS
Status: DISCONTINUED | OUTPATIENT
Start: 2025-01-01 | End: 2025-01-01

## 2025-01-01 RX ORDER — FEXOFENADINE HCL 60 MG/1
60 TABLET, FILM COATED ORAL DAILY
COMMUNITY

## 2025-01-01 RX ORDER — DEXTROSE 50 % IN WATER (D50W) INTRAVENOUS SYRINGE
12.5
Status: DISCONTINUED | OUTPATIENT
Start: 2025-01-01 | End: 2025-01-01

## 2025-01-01 RX ORDER — VANCOMYCIN HYDROCHLORIDE 1 G/20ML
INJECTION, POWDER, LYOPHILIZED, FOR SOLUTION INTRAVENOUS DAILY PRN
Status: DISCONTINUED | OUTPATIENT
Start: 2025-01-01 | End: 2025-01-01

## 2025-01-01 RX ORDER — LEVETIRACETAM 750 MG/1
1500 TABLET ORAL 2 TIMES DAILY
COMMUNITY

## 2025-01-01 RX ORDER — MAGNESIUM SULFATE HEPTAHYDRATE 40 MG/ML
2 INJECTION, SOLUTION INTRAVENOUS ONCE
Status: COMPLETED | OUTPATIENT
Start: 2025-01-01 | End: 2025-01-01

## 2025-01-01 RX ORDER — ETOMIDATE 2 MG/ML
INJECTION INTRAVENOUS
Status: DISPENSED
Start: 2025-01-01 | End: 2025-01-01

## 2025-01-01 RX ORDER — MORPHINE SULFATE 2 MG/ML
INJECTION, SOLUTION INTRAMUSCULAR; INTRAVENOUS
Status: COMPLETED
Start: 2025-01-01 | End: 2025-01-01

## 2025-01-01 RX ORDER — AMLODIPINE BESYLATE 10 MG/1
10 TABLET ORAL DAILY
COMMUNITY

## 2025-01-01 RX ORDER — PANTOPRAZOLE SODIUM 40 MG/10ML
40 INJECTION, POWDER, LYOPHILIZED, FOR SOLUTION INTRAVENOUS DAILY
Status: DISCONTINUED | OUTPATIENT
Start: 2025-01-01 | End: 2025-01-01

## 2025-01-01 RX ORDER — ATORVASTATIN CALCIUM 80 MG/1
80 TABLET, FILM COATED ORAL DAILY
COMMUNITY

## 2025-01-01 RX ORDER — ALBUMIN HUMAN 50 G/1000ML
25 SOLUTION INTRAVENOUS ONCE
Status: COMPLETED | OUTPATIENT
Start: 2025-01-01 | End: 2025-01-01

## 2025-01-01 RX ORDER — THIAMINE HYDROCHLORIDE 100 MG/ML
100 INJECTION, SOLUTION INTRAMUSCULAR; INTRAVENOUS DAILY
Status: DISCONTINUED | OUTPATIENT
Start: 2025-01-01 | End: 2025-01-01

## 2025-01-01 RX ORDER — HEPARIN SODIUM 5000 [USP'U]/ML
5000 INJECTION, SOLUTION INTRAVENOUS; SUBCUTANEOUS EVERY 8 HOURS
Status: DISCONTINUED | OUTPATIENT
Start: 2025-01-01 | End: 2025-01-01

## 2025-01-01 RX ORDER — ACETAMINOPHEN 325 MG/1
650 TABLET ORAL EVERY 6 HOURS PRN
COMMUNITY

## 2025-01-01 RX ORDER — NALTREXONE HYDROCHLORIDE 50 MG/1
50 TABLET, FILM COATED ORAL DAILY
COMMUNITY

## 2025-01-01 RX ORDER — NOREPINEPHRINE BITARTRATE/D5W 8 MG/250ML
PLASTIC BAG, INJECTION (ML) INTRAVENOUS
Status: COMPLETED | OUTPATIENT
Start: 2025-01-01 | End: 2025-01-01

## 2025-01-01 RX ORDER — LANOLIN ALCOHOL/MO/W.PET/CERES
100 CREAM (GRAM) TOPICAL DAILY
COMMUNITY

## 2025-01-01 RX ORDER — LORAZEPAM 2 MG/ML
2 INJECTION INTRAMUSCULAR EVERY 10 MIN PRN
Status: DISCONTINUED | OUTPATIENT
Start: 2025-01-01 | End: 2025-01-01 | Stop reason: HOSPADM

## 2025-01-01 RX ORDER — CEFEPIME 1 G/50ML
1 INJECTION, SOLUTION INTRAVENOUS EVERY 12 HOURS
Status: DISCONTINUED | OUTPATIENT
Start: 2025-01-01 | End: 2025-01-01

## 2025-01-01 RX ORDER — ROCURONIUM BROMIDE 10 MG/ML
INJECTION, SOLUTION INTRAVENOUS
Status: DISPENSED
Start: 2025-01-01 | End: 2025-01-01

## 2025-01-01 RX ORDER — CEFEPIME 1 G/50ML
1 INJECTION, SOLUTION INTRAVENOUS EVERY 8 HOURS
Status: DISCONTINUED | OUTPATIENT
Start: 2025-01-01 | End: 2025-01-01

## 2025-01-01 RX ORDER — LEVETIRACETAM 15 MG/ML
1500 INJECTION INTRAVASCULAR EVERY 12 HOURS
Status: DISCONTINUED | OUTPATIENT
Start: 2025-01-01 | End: 2025-01-01

## 2025-01-01 RX ORDER — DEXTROSE 50 % IN WATER (D50W) INTRAVENOUS SYRINGE
25
Status: DISCONTINUED | OUTPATIENT
Start: 2025-01-01 | End: 2025-01-01

## 2025-01-01 RX ORDER — EPINEPHRINE IN 0.9 % SOD CHLOR 4MG/250ML
0-1 PLASTIC BAG, INJECTION (ML) INTRAVENOUS CONTINUOUS
Status: DISCONTINUED | OUTPATIENT
Start: 2025-01-01 | End: 2025-01-01

## 2025-01-01 RX ORDER — CEFTRIAXONE 2 G/50ML
2 INJECTION, SOLUTION INTRAVENOUS EVERY 24 HOURS
Status: DISCONTINUED | OUTPATIENT
Start: 2025-01-01 | End: 2025-01-01

## 2025-01-01 RX ORDER — CALCIUM GLUCONATE 20 MG/ML
2 INJECTION, SOLUTION INTRAVENOUS ONCE
Status: COMPLETED | OUTPATIENT
Start: 2025-01-01 | End: 2025-01-01

## 2025-01-01 RX ORDER — SODIUM CHLORIDE, SODIUM LACTATE, POTASSIUM CHLORIDE, CALCIUM CHLORIDE 600; 310; 30; 20 MG/100ML; MG/100ML; MG/100ML; MG/100ML
INJECTION, SOLUTION INTRAVENOUS
Status: COMPLETED | OUTPATIENT
Start: 2025-01-01 | End: 2025-01-01

## 2025-01-01 RX ORDER — FOLIC ACID 1 MG/1
1 TABLET ORAL DAILY
COMMUNITY

## 2025-01-01 RX ORDER — PROPOFOL 10 MG/ML
5-50 INJECTION, EMULSION INTRAVENOUS CONTINUOUS
Status: DISCONTINUED | OUTPATIENT
Start: 2025-01-01 | End: 2025-01-01

## 2025-01-01 RX ORDER — PROPOFOL 10 MG/ML
0-50 INJECTION, EMULSION INTRAVENOUS CONTINUOUS
Status: DISCONTINUED | OUTPATIENT
Start: 2025-01-01 | End: 2025-01-01

## 2025-01-01 RX ORDER — POTASSIUM CHLORIDE 14.9 MG/ML
20 INJECTION INTRAVENOUS
Status: COMPLETED | OUTPATIENT
Start: 2025-01-01 | End: 2025-01-01

## 2025-01-01 RX ORDER — EPINEPHRINE 0.1 MG/ML
INJECTION INTRACARDIAC; INTRAVENOUS CODE/TRAUMA/SEDATION MEDICATION
Status: COMPLETED | OUTPATIENT
Start: 2025-01-01 | End: 2025-01-01

## 2025-01-01 RX ORDER — CALCIUM CHLORIDE INJECTION 100 MG/ML
INJECTION, SOLUTION INTRAVENOUS CODE/TRAUMA/SEDATION MEDICATION
Status: COMPLETED | OUTPATIENT
Start: 2025-01-01 | End: 2025-01-01

## 2025-01-01 RX ORDER — ETOMIDATE 2 MG/ML
INJECTION INTRAVENOUS CODE/TRAUMA/SEDATION MEDICATION
Status: COMPLETED | OUTPATIENT
Start: 2025-01-01 | End: 2025-01-01

## 2025-01-01 RX ORDER — MAGNESIUM SULFATE HEPTAHYDRATE 40 MG/ML
4 INJECTION, SOLUTION INTRAVENOUS ONCE
Status: COMPLETED | OUTPATIENT
Start: 2025-01-01 | End: 2025-01-01

## 2025-01-01 RX ORDER — CEFEPIME 1 G/50ML
2 INJECTION, SOLUTION INTRAVENOUS ONCE
Status: COMPLETED | OUTPATIENT
Start: 2025-01-01 | End: 2025-01-01

## 2025-01-01 RX ORDER — INSULIN LISPRO 100 [IU]/ML
0-5 INJECTION, SOLUTION INTRAVENOUS; SUBCUTANEOUS EVERY 4 HOURS
Status: DISCONTINUED | OUTPATIENT
Start: 2025-01-01 | End: 2025-01-01

## 2025-01-01 RX ORDER — GLYCOPYRROLATE 0.2 MG/ML
0.2 INJECTION INTRAMUSCULAR; INTRAVENOUS EVERY 4 HOURS PRN
Status: DISCONTINUED | OUTPATIENT
Start: 2025-01-01 | End: 2025-01-01 | Stop reason: HOSPADM

## 2025-01-01 RX ORDER — ALBUMIN HUMAN 50 G/1000ML
12.5 SOLUTION INTRAVENOUS ONCE
Status: DISCONTINUED | OUTPATIENT
Start: 2025-01-01 | End: 2025-01-01

## 2025-01-01 RX ORDER — ROCURONIUM BROMIDE 10 MG/ML
INJECTION, SOLUTION INTRAVENOUS CODE/TRAUMA/SEDATION MEDICATION
Status: COMPLETED | OUTPATIENT
Start: 2025-01-01 | End: 2025-01-01

## 2025-01-01 RX ORDER — LORAZEPAM 2 MG/ML
INJECTION INTRAMUSCULAR
Status: COMPLETED
Start: 2025-01-01 | End: 2025-01-01

## 2025-01-01 RX ORDER — INDOMETHACIN 25 MG/1
50 CAPSULE ORAL ONCE
Status: COMPLETED | OUTPATIENT
Start: 2025-01-01 | End: 2025-01-01

## 2025-01-01 RX ORDER — MORPHINE SULFATE 4 MG/ML
2 INJECTION INTRAVENOUS
Status: DISCONTINUED | OUTPATIENT
Start: 2025-01-01 | End: 2025-01-01 | Stop reason: HOSPADM

## 2025-01-01 RX ORDER — PROPOFOL 10 MG/ML
INJECTION, EMULSION INTRAVENOUS
Status: COMPLETED
Start: 2025-01-01 | End: 2025-01-01

## 2025-01-01 RX ORDER — NOREPINEPHRINE BITARTRATE/D5W 8 MG/250ML
PLASTIC BAG, INJECTION (ML) INTRAVENOUS
Status: COMPLETED
Start: 2025-01-01 | End: 2025-01-01

## 2025-01-01 RX ORDER — VANCOMYCIN HYDROCHLORIDE 500 MG/100ML
500 INJECTION, SOLUTION INTRAVENOUS ONCE
Status: COMPLETED | OUTPATIENT
Start: 2025-01-01 | End: 2025-01-01

## 2025-01-01 RX ORDER — LOSARTAN POTASSIUM 25 MG/1
25 TABLET ORAL DAILY
COMMUNITY

## 2025-01-01 RX ORDER — INDOMETHACIN 25 MG/1
CAPSULE ORAL
Status: DISPENSED
Start: 2025-01-01 | End: 2025-01-01

## 2025-01-01 RX ORDER — SODIUM CHLORIDE, SODIUM LACTATE, POTASSIUM CHLORIDE, CALCIUM CHLORIDE 600; 310; 30; 20 MG/100ML; MG/100ML; MG/100ML; MG/100ML
75 INJECTION, SOLUTION INTRAVENOUS CONTINUOUS
Status: ACTIVE | OUTPATIENT
Start: 2025-01-01 | End: 2025-01-01

## 2025-01-01 RX ORDER — GADOTERATE MEGLUMINE 376.9 MG/ML
9 INJECTION INTRAVENOUS
Status: COMPLETED | OUTPATIENT
Start: 2025-01-01 | End: 2025-01-01

## 2025-01-01 RX ORDER — THIAMINE HYDROCHLORIDE 100 MG/ML
500 INJECTION, SOLUTION INTRAMUSCULAR; INTRAVENOUS 3 TIMES DAILY
Status: DISCONTINUED | OUTPATIENT
Start: 2025-01-01 | End: 2025-01-01

## 2025-01-01 RX ADMIN — PANTOPRAZOLE SODIUM 40 MG: 40 INJECTION, POWDER, FOR SOLUTION INTRAVENOUS at 08:33

## 2025-01-01 RX ADMIN — VASOPRESSIN 0.03 UNITS/MIN: 0.2 INJECTION INTRAVENOUS at 06:43

## 2025-01-01 RX ADMIN — CEFTRIAXONE SODIUM 2 G: 2 INJECTION, SOLUTION INTRAVENOUS at 10:11

## 2025-01-01 RX ADMIN — SODIUM CHLORIDE, POTASSIUM CHLORIDE, SODIUM LACTATE AND CALCIUM CHLORIDE 1000 ML: 600; 310; 30; 20 INJECTION, SOLUTION INTRAVENOUS at 21:30

## 2025-01-01 RX ADMIN — VASOPRESSIN 0.03 UNITS/MIN: 0.2 INJECTION INTRAVENOUS at 03:59

## 2025-01-01 RX ADMIN — VASOPRESSIN 0.03 UNITS/MIN: 0.2 INJECTION INTRAVENOUS at 21:33

## 2025-01-01 RX ADMIN — HEPARIN SODIUM 5000 UNITS: 5000 INJECTION, SOLUTION INTRAVENOUS; SUBCUTANEOUS at 01:03

## 2025-01-01 RX ADMIN — THIAMINE HYDROCHLORIDE 100 MG: 100 INJECTION, SOLUTION INTRAMUSCULAR; INTRAVENOUS at 09:04

## 2025-01-01 RX ADMIN — NOREPINEPHRINE BITARTRATE 0.16 MCG/KG/MIN: 8 INJECTION, SOLUTION INTRAVENOUS at 01:47

## 2025-01-01 RX ADMIN — Medication 70 PERCENT: at 20:58

## 2025-01-01 RX ADMIN — HEPARIN SODIUM 5000 UNITS: 5000 INJECTION, SOLUTION INTRAVENOUS; SUBCUTANEOUS at 01:41

## 2025-01-01 RX ADMIN — SODIUM CHLORIDE 500 ML: 0.9 INJECTION, SOLUTION INTRAVENOUS at 03:28

## 2025-01-01 RX ADMIN — CEFEPIME 1 G: 1 INJECTION, SOLUTION INTRAVENOUS at 15:44

## 2025-01-01 RX ADMIN — MAGNESIUM SULFATE HEPTAHYDRATE 2 G: 40 INJECTION, SOLUTION INTRAVENOUS at 06:18

## 2025-01-01 RX ADMIN — SODIUM CHLORIDE, SODIUM LACTATE, POTASSIUM CHLORIDE, AND CALCIUM CHLORIDE 1000 ML: .6; .31; .03; .02 INJECTION, SOLUTION INTRAVENOUS at 15:44

## 2025-01-01 RX ADMIN — NOREPINEPHRINE BITARTRATE 0.12 MCG/KG/MIN: 8 INJECTION, SOLUTION INTRAVENOUS at 10:20

## 2025-01-01 RX ADMIN — Medication 30 PERCENT: at 08:30

## 2025-01-01 RX ADMIN — INSULIN LISPRO 1 UNITS: 100 INJECTION, SOLUTION INTRAVENOUS; SUBCUTANEOUS at 03:46

## 2025-01-01 RX ADMIN — HEPARIN SODIUM 5000 UNITS: 5000 INJECTION, SOLUTION INTRAVENOUS; SUBCUTANEOUS at 10:15

## 2025-01-01 RX ADMIN — AZITHROMYCIN DIHYDRATE 500 MG: 500 INJECTION, POWDER, LYOPHILIZED, FOR SOLUTION INTRAVENOUS at 05:34

## 2025-01-01 RX ADMIN — HEPARIN SODIUM 5000 UNITS: 5000 INJECTION, SOLUTION INTRAVENOUS; SUBCUTANEOUS at 17:05

## 2025-01-01 RX ADMIN — VASOPRESSIN 0.03 UNITS/MIN: 0.2 INJECTION INTRAVENOUS at 07:00

## 2025-01-01 RX ADMIN — VANCOMYCIN HYDROCHLORIDE 1250 MG: 1.25 INJECTION, POWDER, LYOPHILIZED, FOR SOLUTION INTRAVENOUS at 05:30

## 2025-01-01 RX ADMIN — SODIUM CHLORIDE, SODIUM LACTATE, POTASSIUM CHLORIDE, AND CALCIUM CHLORIDE 500 ML: .6; .31; .03; .02 INJECTION, SOLUTION INTRAVENOUS at 06:21

## 2025-01-01 RX ADMIN — THIAMINE HYDROCHLORIDE 500 MG: 100 INJECTION, SOLUTION INTRAMUSCULAR; INTRAVENOUS at 09:05

## 2025-01-01 RX ADMIN — CEFEPIME 1 G: 1 INJECTION, SOLUTION INTRAVENOUS at 19:29

## 2025-01-01 RX ADMIN — CALCIUM GLUCONATE 2 G: 20 INJECTION, SOLUTION INTRAVENOUS at 06:18

## 2025-01-01 RX ADMIN — THIAMINE HYDROCHLORIDE 500 MG: 100 INJECTION, SOLUTION INTRAMUSCULAR; INTRAVENOUS at 02:03

## 2025-01-01 RX ADMIN — Medication 30 PERCENT: at 07:00

## 2025-01-01 RX ADMIN — THIAMINE HYDROCHLORIDE 500 MG: 100 INJECTION, SOLUTION INTRAMUSCULAR; INTRAVENOUS at 08:29

## 2025-01-01 RX ADMIN — SODIUM BICARBONATE 50 MEQ: 84 INJECTION, SOLUTION INTRAVENOUS at 15:22

## 2025-01-01 RX ADMIN — PROPOFOL 10 MCG/KG/MIN: 10 INJECTION, EMULSION INTRAVENOUS at 22:24

## 2025-01-01 RX ADMIN — Medication 30 PERCENT: at 04:05

## 2025-01-01 RX ADMIN — GADOTERATE MEGLUMINE 9 ML: 376.9 INJECTION INTRAVENOUS at 18:02

## 2025-01-01 RX ADMIN — LEVETIRACETAM 750 MG: 500 INJECTION, SOLUTION INTRAVENOUS at 15:22

## 2025-01-01 RX ADMIN — LEVETIRACETAM 750 MG: 500 INJECTION, SOLUTION INTRAVENOUS at 15:43

## 2025-01-01 RX ADMIN — Medication 30 PERCENT: at 20:56

## 2025-01-01 RX ADMIN — SODIUM CHLORIDE, SODIUM LACTATE, POTASSIUM CHLORIDE, AND CALCIUM CHLORIDE 1000 ML: .6; .31; .03; .02 INJECTION, SOLUTION INTRAVENOUS at 11:19

## 2025-01-01 RX ADMIN — POTASSIUM CHLORIDE 20 MEQ: 14.9 INJECTION, SOLUTION INTRAVENOUS at 07:21

## 2025-01-01 RX ADMIN — PANTOPRAZOLE SODIUM 40 MG: 40 INJECTION, POWDER, FOR SOLUTION INTRAVENOUS at 08:47

## 2025-01-01 RX ADMIN — SODIUM CHLORIDE, SODIUM LACTATE, POTASSIUM CHLORIDE, AND CALCIUM CHLORIDE 75 ML/HR: .6; .31; .03; .02 INJECTION, SOLUTION INTRAVENOUS at 00:21

## 2025-01-01 RX ADMIN — VASOPRESSIN 0.03 UNITS/MIN: 0.2 INJECTION INTRAVENOUS at 09:00

## 2025-01-01 RX ADMIN — HEPARIN SODIUM 5000 UNITS: 5000 INJECTION, SOLUTION INTRAVENOUS; SUBCUTANEOUS at 09:35

## 2025-01-01 RX ADMIN — PROPOFOL 15 MCG/KG/MIN: 10 INJECTION, EMULSION INTRAVENOUS at 15:58

## 2025-01-01 RX ADMIN — SODIUM CHLORIDE 1000 ML: 0.9 INJECTION, SOLUTION INTRAVENOUS at 03:41

## 2025-01-01 RX ADMIN — PROPOFOL 5 MCG/KG/MIN: 10 INJECTION, EMULSION INTRAVENOUS at 00:24

## 2025-01-01 RX ADMIN — EPINEPHRINE 1 MG: 0.1 INJECTION INTRAVENOUS at 20:23

## 2025-01-01 RX ADMIN — MORPHINE SULFATE 2 MG: 2 INJECTION, SOLUTION INTRAMUSCULAR; INTRAVENOUS at 14:19

## 2025-01-01 RX ADMIN — PROPOFOL 1000 MG: 10 INJECTION, EMULSION INTRAVENOUS at 21:30

## 2025-01-01 RX ADMIN — THIAMINE HYDROCHLORIDE 500 MG: 100 INJECTION, SOLUTION INTRAMUSCULAR; INTRAVENOUS at 09:35

## 2025-01-01 RX ADMIN — MAGNESIUM SULFATE HEPTAHYDRATE 4 G: 40 INJECTION, SOLUTION INTRAVENOUS at 07:20

## 2025-01-01 RX ADMIN — ROCURONIUM 60 MG: 50 INJECTION, SOLUTION INTRAVENOUS at 20:39

## 2025-01-01 RX ADMIN — PROPOFOL 20 MCG/KG/MIN: 10 INJECTION, EMULSION INTRAVENOUS at 17:54

## 2025-01-01 RX ADMIN — THIAMINE HYDROCHLORIDE 500 MG: 100 INJECTION, SOLUTION INTRAMUSCULAR; INTRAVENOUS at 18:11

## 2025-01-01 RX ADMIN — Medication 8000 MCG: at 21:07

## 2025-01-01 RX ADMIN — NOREPINEPHRINE BITARTRATE 0.1 MCG/KG/MIN: 8 INJECTION, SOLUTION INTRAVENOUS at 00:31

## 2025-01-01 RX ADMIN — LEVETIRACETAM 750 MG: 500 INJECTION, SOLUTION INTRAVENOUS at 04:05

## 2025-01-01 RX ADMIN — LEVETIRACETAM 750 MG: 500 INJECTION, SOLUTION INTRAVENOUS at 03:39

## 2025-01-01 RX ADMIN — CALCIUM GLUCONATE 2 G: 20 INJECTION, SOLUTION INTRAVENOUS at 15:35

## 2025-01-01 RX ADMIN — POTASSIUM CHLORIDE 20 MEQ: 14.9 INJECTION, SOLUTION INTRAVENOUS at 09:10

## 2025-01-01 RX ADMIN — LORAZEPAM 2 MG: 2 INJECTION, SOLUTION INTRAMUSCULAR; INTRAVENOUS at 14:19

## 2025-01-01 RX ADMIN — NOREPINEPHRINE BITARTRATE 0.18 MCG/KG/MIN: 8 INJECTION, SOLUTION INTRAVENOUS at 15:43

## 2025-01-01 RX ADMIN — CEFEPIME 1 G: 1 INJECTION, SOLUTION INTRAVENOUS at 03:39

## 2025-01-01 RX ADMIN — LEVETIRACETAM 750 MG: 500 INJECTION, SOLUTION INTRAVENOUS at 03:59

## 2025-01-01 RX ADMIN — SODIUM CHLORIDE, POTASSIUM CHLORIDE, SODIUM LACTATE AND CALCIUM CHLORIDE 1000 ML: 600; 310; 30; 20 INJECTION, SOLUTION INTRAVENOUS at 20:40

## 2025-01-01 RX ADMIN — CEFEPIME 1 G: 1 INJECTION, SOLUTION INTRAVENOUS at 11:21

## 2025-01-01 RX ADMIN — EPINEPHRINE 1 MG: 0.1 INJECTION INTRAVENOUS at 20:19

## 2025-01-01 RX ADMIN — VANCOMYCIN HYDROCHLORIDE 1000 MG: 1 INJECTION, SOLUTION INTRAVENOUS at 04:35

## 2025-01-01 RX ADMIN — CEFTRIAXONE SODIUM 2 G: 2 INJECTION, SOLUTION INTRAVENOUS at 09:25

## 2025-01-01 RX ADMIN — LEVETIRACETAM 1500 MG: 15 INJECTION INTRAVENOUS at 04:51

## 2025-01-01 RX ADMIN — ENOXAPARIN SODIUM 40 MG: 100 INJECTION SUBCUTANEOUS at 04:06

## 2025-01-01 RX ADMIN — LEVETIRACETAM 750 MG: 500 INJECTION, SOLUTION INTRAVENOUS at 15:23

## 2025-01-01 RX ADMIN — SODIUM CHLORIDE, POTASSIUM CHLORIDE, SODIUM LACTATE AND CALCIUM CHLORIDE 1000 ML: 600; 310; 30; 20 INJECTION, SOLUTION INTRAVENOUS at 21:19

## 2025-01-01 RX ADMIN — INSULIN LISPRO 1 UNITS: 100 INJECTION, SOLUTION INTRAVENOUS; SUBCUTANEOUS at 19:29

## 2025-01-01 RX ADMIN — INSULIN LISPRO 1 UNITS: 100 INJECTION, SOLUTION INTRAVENOUS; SUBCUTANEOUS at 15:43

## 2025-01-01 RX ADMIN — THIAMINE HYDROCHLORIDE 500 MG: 100 INJECTION, SOLUTION INTRAMUSCULAR; INTRAVENOUS at 19:25

## 2025-01-01 RX ADMIN — LEVETIRACETAM 750 MG: 500 INJECTION, SOLUTION INTRAVENOUS at 15:44

## 2025-01-01 RX ADMIN — SODIUM CHLORIDE 1000 ML: 0.9 INJECTION, SOLUTION INTRAVENOUS at 10:15

## 2025-01-01 RX ADMIN — ENOXAPARIN SODIUM 40 MG: 100 INJECTION SUBCUTANEOUS at 03:59

## 2025-01-01 RX ADMIN — SODIUM CHLORIDE, SODIUM LACTATE, POTASSIUM CHLORIDE, AND CALCIUM CHLORIDE 500 ML: .6; .31; .03; .02 INJECTION, SOLUTION INTRAVENOUS at 03:42

## 2025-01-01 RX ADMIN — PROPOFOL 20 MCG/KG/MIN: 10 INJECTION, EMULSION INTRAVENOUS at 00:29

## 2025-01-01 RX ADMIN — NOREPINEPHRINE BITARTRATE 0.12 MCG/KG/MIN: 8 INJECTION, SOLUTION INTRAVENOUS at 06:43

## 2025-01-01 RX ADMIN — LEVETIRACETAM 750 MG: 500 INJECTION, SOLUTION INTRAVENOUS at 03:28

## 2025-01-01 RX ADMIN — VANCOMYCIN HYDROCHLORIDE 500 MG: 500 INJECTION, SOLUTION INTRAVENOUS at 04:05

## 2025-01-01 RX ADMIN — VASOPRESSIN 0.03 UNITS/MIN: 0.2 INJECTION INTRAVENOUS at 15:39

## 2025-01-01 RX ADMIN — THIAMINE HYDROCHLORIDE 500 MG: 100 INJECTION, SOLUTION INTRAMUSCULAR; INTRAVENOUS at 12:09

## 2025-01-01 RX ADMIN — PANTOPRAZOLE SODIUM 40 MG: 40 INJECTION, POWDER, FOR SOLUTION INTRAVENOUS at 08:29

## 2025-01-01 RX ADMIN — THIAMINE HYDROCHLORIDE 500 MG: 100 INJECTION, SOLUTION INTRAMUSCULAR; INTRAVENOUS at 15:22

## 2025-01-01 RX ADMIN — CALCIUM CHLORIDE 1 G: 100 INJECTION INTRAVENOUS; INTRAVENTRICULAR at 20:20

## 2025-01-01 RX ADMIN — THIAMINE HYDROCHLORIDE 500 MG: 100 INJECTION, SOLUTION INTRAMUSCULAR; INTRAVENOUS at 20:22

## 2025-01-01 RX ADMIN — Medication 0.1 MCG/KG/MIN: at 21:06

## 2025-01-01 RX ADMIN — SODIUM CHLORIDE, SODIUM LACTATE, POTASSIUM CHLORIDE, AND CALCIUM CHLORIDE 500 ML: .6; .31; .03; .02 INJECTION, SOLUTION INTRAVENOUS at 01:41

## 2025-01-01 RX ADMIN — IOHEXOL 80 ML: 350 INJECTION, SOLUTION INTRAVENOUS at 22:21

## 2025-01-01 RX ADMIN — INSULIN LISPRO 1 UNITS: 100 INJECTION, SOLUTION INTRAVENOUS; SUBCUTANEOUS at 07:29

## 2025-01-01 RX ADMIN — SODIUM CHLORIDE, POTASSIUM CHLORIDE, SODIUM LACTATE AND CALCIUM CHLORIDE 10001 ML: 600; 310; 30; 20 INJECTION, SOLUTION INTRAVENOUS at 20:34

## 2025-01-01 RX ADMIN — ENOXAPARIN SODIUM 40 MG: 100 INJECTION SUBCUTANEOUS at 03:39

## 2025-01-01 RX ADMIN — INSULIN LISPRO 3 UNITS: 100 INJECTION, SOLUTION INTRAVENOUS; SUBCUTANEOUS at 03:51

## 2025-01-01 RX ADMIN — Medication 30 PERCENT: at 14:23

## 2025-01-01 RX ADMIN — Medication 30 PERCENT: at 14:00

## 2025-01-01 RX ADMIN — INSULIN LISPRO 2 UNITS: 100 INJECTION, SOLUTION INTRAVENOUS; SUBCUTANEOUS at 15:30

## 2025-01-01 RX ADMIN — NOREPINEPHRINE BITARTRATE 0.14 MCG/KG/MIN: 8 INJECTION, SOLUTION INTRAVENOUS at 09:10

## 2025-01-01 RX ADMIN — NOREPINEPHRINE BITARTRATE 0.16 MCG/KG/MIN: 8 INJECTION, SOLUTION INTRAVENOUS at 20:18

## 2025-01-01 RX ADMIN — HEPARIN SODIUM 5000 UNITS: 5000 INJECTION, SOLUTION INTRAVENOUS; SUBCUTANEOUS at 18:51

## 2025-01-01 RX ADMIN — CEFEPIME 1 G: 1 INJECTION, SOLUTION INTRAVENOUS at 04:03

## 2025-01-01 RX ADMIN — PROPOFOL 20 MCG/KG/MIN: 10 INJECTION, EMULSION INTRAVENOUS at 07:00

## 2025-01-01 RX ADMIN — VASOPRESSIN 0.03 UNITS/MIN: 0.2 INJECTION INTRAVENOUS at 02:03

## 2025-01-01 RX ADMIN — NOREPINEPHRINE BITARTRATE 0.15 MCG/KG/MIN: 8 INJECTION, SOLUTION INTRAVENOUS at 09:19

## 2025-01-01 RX ADMIN — PANTOPRAZOLE SODIUM 40 MG: 40 INJECTION, POWDER, FOR SOLUTION INTRAVENOUS at 07:48

## 2025-01-01 RX ADMIN — CEFTRIAXONE SODIUM 2 G: 2 INJECTION, SOLUTION INTRAVENOUS at 11:46

## 2025-01-01 RX ADMIN — CEFEPIME 2 G: 1 INJECTION, SOLUTION INTRAVENOUS at 03:10

## 2025-01-01 RX ADMIN — Medication 30 PERCENT: at 21:09

## 2025-01-01 RX ADMIN — SODIUM CHLORIDE, SODIUM LACTATE, POTASSIUM CHLORIDE, AND CALCIUM CHLORIDE 1000 ML: 600; 310; 30; 20 INJECTION, SOLUTION INTRAVENOUS at 09:09

## 2025-01-01 RX ADMIN — PERFLUTREN 2 ML OF DILUTION: 6.52 INJECTION, SUSPENSION INTRAVENOUS at 13:43

## 2025-01-01 RX ADMIN — SODIUM CHLORIDE, SODIUM LACTATE, POTASSIUM CHLORIDE, AND CALCIUM CHLORIDE 1000 ML: .6; .31; .03; .02 INJECTION, SOLUTION INTRAVENOUS at 21:54

## 2025-01-01 RX ADMIN — NOREPINEPHRINE BITARTRATE 8000 MCG: 8 INJECTION, SOLUTION INTRAVENOUS at 21:07

## 2025-01-01 RX ADMIN — ALBUMIN HUMAN 25 G: 0.05 INJECTION, SOLUTION INTRAVENOUS at 10:14

## 2025-01-01 RX ADMIN — PROPOFOL 15 MCG/KG/MIN: 10 INJECTION, EMULSION INTRAVENOUS at 20:18

## 2025-01-01 RX ADMIN — NOREPINEPHRINE BITARTRATE 0.08 MCG/KG/MIN: 8 INJECTION, SOLUTION INTRAVENOUS at 07:00

## 2025-01-01 RX ADMIN — SODIUM CHLORIDE 500 ML: 0.9 INJECTION, SOLUTION INTRAVENOUS at 14:30

## 2025-01-01 RX ADMIN — INSULIN LISPRO 1 UNITS: 100 INJECTION, SOLUTION INTRAVENOUS; SUBCUTANEOUS at 04:00

## 2025-01-01 RX ADMIN — LORAZEPAM 2 MG: 2 INJECTION INTRAMUSCULAR at 14:19

## 2025-01-01 RX ADMIN — NOREPINEPHRINE BITARTRATE 0.11 MCG/KG/MIN: 8 INJECTION, SOLUTION INTRAVENOUS at 10:25

## 2025-01-01 RX ADMIN — VANCOMYCIN HYDROCHLORIDE 1000 MG: 1 INJECTION, SOLUTION INTRAVENOUS at 04:36

## 2025-01-01 RX ADMIN — PANTOPRAZOLE SODIUM 40 MG: 40 INJECTION, POWDER, FOR SOLUTION INTRAVENOUS at 09:04

## 2025-01-01 RX ADMIN — ETOMIDATE INJECTION 10 MG: 2 SOLUTION INTRAVENOUS at 20:40

## 2025-01-01 RX ADMIN — THIAMINE HYDROCHLORIDE 500 MG: 100 INJECTION, SOLUTION INTRAMUSCULAR; INTRAVENOUS at 01:03

## 2025-01-01 RX ADMIN — Medication 30 PERCENT: at 07:35

## 2025-01-01 RX ADMIN — SODIUM CHLORIDE, SODIUM LACTATE, POTASSIUM CHLORIDE, AND CALCIUM CHLORIDE 1000 ML: .6; .31; .03; .02 INJECTION, SOLUTION INTRAVENOUS at 11:03

## 2025-01-01 RX ADMIN — THIAMINE HYDROCHLORIDE 500 MG: 100 INJECTION, SOLUTION INTRAMUSCULAR; INTRAVENOUS at 18:51

## 2025-01-01 SDOH — SOCIAL STABILITY: SOCIAL INSECURITY
WITHIN THE LAST YEAR, HAVE YOU BEEN RAPED OR FORCED TO HAVE ANY KIND OF SEXUAL ACTIVITY BY YOUR PARTNER OR EX-PARTNER?: PATIENT UNABLE TO ANSWER

## 2025-01-01 SDOH — ECONOMIC STABILITY: FOOD INSECURITY
WITHIN THE PAST 12 MONTHS, THE FOOD YOU BOUGHT JUST DIDN'T LAST AND YOU DIDN'T HAVE MONEY TO GET MORE.: PATIENT UNABLE TO ANSWER

## 2025-01-01 SDOH — SOCIAL STABILITY: SOCIAL NETWORK: HOW OFTEN DO YOU ATTEND MEETINGS OF THE CLUBS OR ORGANIZATIONS YOU BELONG TO?: PATIENT UNABLE TO ANSWER

## 2025-01-01 SDOH — SOCIAL STABILITY: SOCIAL NETWORK: HOW OFTEN DO YOU GET TOGETHER WITH FRIENDS OR RELATIVES?: PATIENT UNABLE TO ANSWER

## 2025-01-01 SDOH — SOCIAL STABILITY: SOCIAL INSECURITY
WITHIN THE LAST YEAR, HAVE YOU BEEN KICKED, HIT, SLAPPED, OR OTHERWISE PHYSICALLY HURT BY YOUR PARTNER OR EX-PARTNER?: PATIENT UNABLE TO ANSWER

## 2025-01-01 SDOH — ECONOMIC STABILITY: INCOME INSECURITY
IN THE PAST 12 MONTHS HAS THE ELECTRIC, GAS, OIL, OR WATER COMPANY THREATENED TO SHUT OFF SERVICES IN YOUR HOME?: PATIENT UNABLE TO ANSWER

## 2025-01-01 SDOH — SOCIAL STABILITY: SOCIAL INSECURITY: WERE YOU ABLE TO COMPLETE ALL THE BEHAVIORAL HEALTH SCREENINGS?: NO

## 2025-01-01 SDOH — ECONOMIC STABILITY: HOUSING INSECURITY
IN THE LAST 12 MONTHS, WAS THERE A TIME WHEN YOU WERE NOT ABLE TO PAY THE MORTGAGE OR RENT ON TIME?: PATIENT UNABLE TO ANSWER

## 2025-01-01 SDOH — ECONOMIC STABILITY: FOOD INSECURITY
HOW HARD IS IT FOR YOU TO PAY FOR THE VERY BASICS LIKE FOOD, HOUSING, MEDICAL CARE, AND HEATING?: PATIENT UNABLE TO ANSWER

## 2025-01-01 SDOH — SOCIAL STABILITY: SOCIAL INSECURITY: ARE YOU MARRIED, WIDOWED, DIVORCED, SEPARATED, NEVER MARRIED, OR LIVING WITH A PARTNER?: PATIENT UNABLE TO ANSWER

## 2025-01-01 SDOH — ECONOMIC STABILITY: HOUSING INSECURITY: AT ANY TIME IN THE PAST 12 MONTHS, WERE YOU HOMELESS OR LIVING IN A SHELTER (INCLUDING NOW)?: PATIENT UNABLE TO ANSWER

## 2025-01-01 SDOH — SOCIAL STABILITY: SOCIAL NETWORK: IN A TYPICAL WEEK, HOW MANY TIMES DO YOU TALK ON THE PHONE WITH FAMILY, FRIENDS, OR NEIGHBORS?: PATIENT UNABLE TO ANSWER

## 2025-01-01 SDOH — SOCIAL STABILITY: SOCIAL NETWORK: HOW OFTEN DO YOU ATTEND CHURCH OR RELIGIOUS SERVICES?: PATIENT UNABLE TO ANSWER

## 2025-01-01 SDOH — SOCIAL STABILITY: SOCIAL INSECURITY
WITHIN THE LAST YEAR, HAVE YOU BEEN HUMILIATED OR EMOTIONALLY ABUSED IN OTHER WAYS BY YOUR PARTNER OR EX-PARTNER?: PATIENT UNABLE TO ANSWER

## 2025-01-01 SDOH — ECONOMIC STABILITY: FOOD INSECURITY
WITHIN THE PAST 12 MONTHS, YOU WORRIED THAT YOUR FOOD WOULD RUN OUT BEFORE YOU GOT THE MONEY TO BUY MORE.: PATIENT UNABLE TO ANSWER

## 2025-01-01 SDOH — SOCIAL STABILITY: SOCIAL NETWORK
DO YOU BELONG TO ANY CLUBS OR ORGANIZATIONS SUCH AS CHURCH GROUPS, UNIONS, FRATERNAL OR ATHLETIC GROUPS, OR SCHOOL GROUPS?: PATIENT UNABLE TO ANSWER

## 2025-01-01 SDOH — SOCIAL STABILITY: SOCIAL INSECURITY: WITHIN THE LAST YEAR, HAVE YOU BEEN AFRAID OF YOUR PARTNER OR EX-PARTNER?: PATIENT UNABLE TO ANSWER

## 2025-01-01 SDOH — HEALTH STABILITY: PHYSICAL HEALTH
HOW OFTEN DO YOU NEED TO HAVE SOMEONE HELP YOU WHEN YOU READ INSTRUCTIONS, PAMPHLETS, OR OTHER WRITTEN MATERIAL FROM YOUR DOCTOR OR PHARMACY?: PATIENT UNABLE TO RESPOND

## 2025-01-01 SDOH — HEALTH STABILITY: MENTAL HEALTH
DO YOU FEEL STRESS - TENSE, RESTLESS, NERVOUS, OR ANXIOUS, OR UNABLE TO SLEEP AT NIGHT BECAUSE YOUR MIND IS TROUBLED ALL THE TIME - THESE DAYS?: PATIENT UNABLE TO ANSWER

## 2025-01-01 SDOH — SOCIAL STABILITY: SOCIAL INSECURITY: HAVE YOU HAD THOUGHTS OF HARMING ANYONE ELSE?: UNABLE TO ASSESS

## 2025-01-01 SDOH — ECONOMIC STABILITY: TRANSPORTATION INSECURITY
IN THE PAST 12 MONTHS, HAS LACK OF TRANSPORTATION KEPT YOU FROM MEDICAL APPOINTMENTS OR FROM GETTING MEDICATIONS?: PATIENT UNABLE TO ANSWER

## 2025-01-01 ASSESSMENT — PAIN - FUNCTIONAL ASSESSMENT

## 2025-01-01 ASSESSMENT — ACTIVITIES OF DAILY LIVING (ADL)
JUDGMENT_ADEQUATE_SAFELY_COMPLETE_DAILY_ACTIVITIES: UNABLE TO ASSESS
DRESSING YOURSELF: UNABLE TO ASSESS
FEEDING YOURSELF: UNABLE TO ASSESS
BATHING: UNABLE TO ASSESS
GROOMING: UNABLE TO ASSESS
LACK_OF_TRANSPORTATION: PATIENT UNABLE TO ANSWER
PATIENT'S MEMORY ADEQUATE TO SAFELY COMPLETE DAILY ACTIVITIES?: UNABLE TO ASSESS
WALKS IN HOME: UNABLE TO ASSESS
TOILETING: UNABLE TO ASSESS
HEARING - LEFT EAR: UNABLE TO ASSESS
HEARING - RIGHT EAR: UNABLE TO ASSESS
ADEQUATE_TO_COMPLETE_ADL: UNABLE TO ASSESS
ASSISTIVE_DEVICE: OTHER (COMMENT)

## 2025-01-01 ASSESSMENT — LIFESTYLE VARIABLES
HOW OFTEN DO YOU HAVE 6 OR MORE DRINKS ON ONE OCCASION: PATIENT UNABLE TO ANSWER
HOW OFTEN DO YOU HAVE A DRINK CONTAINING ALCOHOL: PATIENT UNABLE TO ANSWER
HOW MANY STANDARD DRINKS CONTAINING ALCOHOL DO YOU HAVE ON A TYPICAL DAY: PATIENT UNABLE TO ANSWER
SKIP TO QUESTIONS 9-10: 0
AUDIT-C TOTAL SCORE: -1
AUDIT-C TOTAL SCORE: -1

## 2025-04-28 PROBLEM — I46.9 CARDIAC ARREST: Status: ACTIVE | Noted: 2025-01-01

## 2025-04-29 NOTE — PROCEDURES
Central Line Placement Procedure Note    Indication: parenteral medications (I.e. chemo, vasoactive)     Consent: the patient    UNIVERSAL PROTOCOL/ TIMEOUT:  Preprocedure verification is complete patient verified and consents confirmed, procedure sites are identified and marked, timeout was called before the start of the procedure.     right internal jugular vein was prepped with chlorhexidine and draped in a sterile fashion. Local anesthesia was with None was used. Real time ultrasound was utilized throughout this procedure. A large bore needle was used to identify the vein.  A guide wire was then inserted into the vein through the needle. Triple lumen catheter was then inserted into the vessel over the guide wire using the Seldinger technique.  All ports showed good, free flowing blood return and were flushed with saline solution.  The catheter was then securely fastened to the skin with sutures and covered with a sterile dressing.  A post procedure X-ray showed good line position.    The patient tolerated the procedure Patient tolerated the procedure well.    Complications: None

## 2025-04-29 NOTE — H&P
Medical Intensive Care - History and Physical   Subjective    Hector Talley is a 74 y.o. year old male patient admitted on 4/28/2025 with following ICU needs: post cardiac arrest, mechanical ventilation, shock requiring vasopressors     HPI:  73 yo M pmhx HTN, EtOH use disorder, pre-diabetes, focal epilepsy who presented to the ED post out of hospital cardiac arrest. Per ED physician note:     -patient had 3-5 mins of downtime prior to fire squad arrival, who then started CPR and continued 5 mins until EMS arrived. After EMS arrival, patient had 20-25 mins of CPR until patient presented to ED. Per report, rhythm was PEA/asystole, no shocks. Patient received epinephrine x2 via intraosseous line prior to arrival. Rescue breathing via LMA. Upon arrival to the ED, CPR continued, 2 additional doses of epinephrine given w/ 2 rounds of CPR, 1 dose Ca gluconate. On second pulse check in ED, patient noted to have ROSC, sinus tach noted on monitor. VBG at that time showing pH 7.10 / pCO2 75, lactic acid 16.7. Patient given 2L IVF and started on norepinephrine for BP support. Pt intubated in the ED.     On arrival to the floor, patient was on 5 of propofol which was weaned off. Patient without reacting to pain/verbal stimulation. Upon calling his wife, she corroborates the story from the ED notes, that patient threw away his dentures a few weeks ago and hasn't really been eating since then. She says that he has continued to drink beer, couldn't elucidate further. She says that he has been a lot weaker over the past 1-2 weeks, hasn't been able to really get up and go to the bathroom himself or do much of anything. She says that he wasn't complaining of any particular symptoms (cough/fever, chest pain, shortness of breath). She was talking to the patient then walked out of the room, a few minutes after talking to him she heard a thump, he went back in his room and he was lying on the floor. Apparently the patient's daughter  started compressions and she called EMS.    In the ED:   Pt hypothermic to 35.1, cooling started targed <34C after ROSC  POC glucose 114  VBG 7.10 / 75, lactate 7.3 -> repeat after interventions pH 7.31 / pCO2 37, lactate 5.9  CMP: Na 140, K 4.2, Cl 101, bicarb 17, BUN 18, Cr 0.89, Ca 11.5; AST 36, ALT 24, tB 1.3, alk phos 49  CBC: WBC 4.3, Hbg 10.5, Plt 160  Trop: 88->149  Pt intubated in ED w/ nichole/etomidate  ECG showing sinus rhythm, no acute ischemic change     Intervention:   -LR 3L, calcium chloride, epi 1mg, levophed started peripherally    CT PE:   1. No evidence of acute pulmonary embolism.  2. Diffuse emphysema throughout the bilateral lungs with tree-in-bud  opacities throughout the posterior right upper lobe concerning for an  infectious/inflammatory etiology.  3. Several nondisplaced to minimally displaced right-sided rib  fractures.  4. Partially visualized compression fracture of the L2 vertebral  body, better evaluated on the same-day CT abdomen and pelvis.  5. Enteric tube coursing through the length of the esophagus with the  tip terminating within the distal esophagus. Recommend advancement.    CT AP w/ contrast:   1. Enteric tube terminating within the distal esophagus, recommend  advancement.  2. The kidneys are hyperenhancing and there is mucosal  hyperenhancement throughout multiple bowel loops, which can be seen  in the setting of hypoperfusion given patient's reported history of  recent cardiac arrest.  3. The urinary bladder is mildly distended with urine with a Rich  catheter in place. Several foci of air throughout the urinary  bladder, likely related to recent instrumentation.  4. Age-indeterminate compression fracture of the L2 vertebral body  with approximately 50% height loss.  5. Severe atherosclerosis of the abdominal aorta and its major  branches.      Home meds: keppra 1500mg BID  Amlodipine 10mg daily   Losartan 25mg daily   Atorva 80mg daily  Acetaminophen prn    Review of  "Systems:  Review of Systems       Meds    Home medications:  Current Outpatient Medications   Medication Instructions    acetaminophen (TYLENOL) 650 mg, Every 6 hours PRN    amLODIPine (NORVASC) 10 mg, Daily    atorvastatin (LIPITOR) 80 mg, Daily    fexofenadine (ALLEGRA) 60 mg, Daily    folic acid (FOLVITE) 1 mg, Daily    levETIRAcetam (KEPPRA) 1,500 mg, 2 times daily    losartan (COZAAR) 25 mg, Daily    naltrexone (DEPADE) 50 mg, Daily    thiamine (VITAMIN B-1) 100 mg, Daily        Inpatient medications:  Scheduled medications  Scheduled Medications[1]  Continuous medications  Continuous Medications[2]  PRN medications  PRN Medications[3]     Objective    Blood pressure 111/70, pulse 87, temperature (!) 32.6 °C (90.7 °F), resp. rate 17, height 1.753 m (5' 9\"), weight 55 kg (121 lb 4.1 oz), SpO2 100%.     Physical Exam  Constitutional:       Appearance: He is ill-appearing.      Comments: Cachectic   HENT:      Mouth/Throat:      Mouth: Mucous membranes are moist.      Pharynx: Oropharynx is clear.   Eyes:      General: No scleral icterus.     Comments: Pupils 3mm and not reactive to light    Cardiovascular:      Rate and Rhythm: Normal rate. Rhythm irregular.      Pulses: Normal pulses.      Heart sounds: No murmur heard.     No gallop.   Pulmonary:      Comments: Mechanical breath sounds bilaterally; largely clear   Abdominal:      General: Abdomen is flat. There is no distension.      Palpations: Abdomen is soft.      Tenderness: There is no abdominal tenderness.   Musculoskeletal:      Right lower leg: No edema.      Left lower leg: No edema.   Skin:     General: Skin is dry.      Findings: No rash.   Neurological:      Comments: Doesn't withdraw to pain; no cough or gag reflex             Intake/Output Summary (Last 24 hours) at 4/29/2025 0426  Last data filed at 4/29/2025 0210  Gross per 24 hour   Intake --   Output 700 ml   Net -700 ml     Labs:   Results from last 72 hours   Lab Units 04/28/25 2054 " "  SODIUM mmol/L 140   POTASSIUM mmol/L 4.2   CHLORIDE mmol/L 101   CO2 mmol/L 17*   BUN mg/dL 18   CREATININE mg/dL 0.89   GLUCOSE mg/dL 92   CALCIUM mg/dL 11.5*   ANION GAP mmol/L 26*   EGFR mL/min/1.73m*2 89   PHOSPHORUS mg/dL 6.9*      Results from last 72 hours   Lab Units 04/28/25  2054   WBC AUTO x10*3/uL 4.3*   HEMOGLOBIN g/dL 10.5*   HEMATOCRIT % 31.1*   PLATELETS AUTO x10*3/uL 160   LYMPHO PCT MAN % 21.7   MONO PCT MAN % 4.4   EOSINO PCT MAN % 0.0      Results from last 72 hours   Lab Units 04/29/25  0355   POCT PH, ARTERIAL pH 7.48*   POCT PCO2, ARTERIAL mm Hg 23*   POCT PO2, ARTERIAL mm Hg 138*   POCT SO2, ARTERIAL % 99     Results from last 72 hours   Lab Units 04/29/25  0005 04/28/25 2042   POCT PH, VENOUS pH 7.31* 7.10*   POCT PCO2, VENOUS mm Hg 37* 75*   POCT PO2, VENOUS mm Hg 36 41        Micro/ID:     No results found for: \"URINECULTURE\", \"BLOODCULT\", \"CSFCULTSMEAR\"    Summary of Key Imaging Results  CT chest without PE, ? Patchy consolidations in RUL infectious/inflammatory  CT AP with hyperenhancement of bowel c/w hypoperfusion, compression fracture L2 ? Age  CT head without significant abnormality     Assessment and Plan     Assessment:  Hector Talley is a 74 y.o. year old male patient admitted to the MICU on 04/29/25 for shock requiring vasopressors, mechanical ventilation.     Mechanical Ventilation: < 4 days  Sedation/Analgesia:  Propofol  Restraints: Restraints indicated as alternative therapies have been attempted and have been ineffective.  Restrain with soft wrist restraints and side rails up x4 until medical devices discontinued and/or patient able to participate with plan of care.     Plan:  NEUROLOGY/PSYCH:  Dx:  History of focal epilepsy   Pt w/ history of epilepsy on keppra, hx of seizures 2/2 med noncompliance; wife states has been compliant w/ keppra, no seizure like activity witnessed at time of arrest  Sedation  EtOH use disorder  Management:  RASS goal of -4 - -5 for ICE CAP " trial for temp management   Thiamine supplementation 500mg IV q8hr given history of EtOH  Keppra level pending  Continue keppra 1500mg IV BID  Continuous EEG     CARDIOVASCULAR:  Dx:  S/p Cardiac arrest   Shock, undifferentiated  Out of hospital arrest not witnessed by family; s/p ~30 mins of CPR, 4 rounds of Epi prior to ROSC; rhythm PEA/Asystole   Etiology of arrest uncertain; likely hypovolemia given history of poor PO intake over the past few weeks; POCUS without obvious pericardial effusion, CXR and ultrasound without PTX, electrolytes largely stable, minimal oxygenation on vent thus likely not hypoxic; patient was very acidotic on admit, but at that point likely 2/2 arrest, CT PE negative for thrombus, ECG w/out obvious ischemic changes, POCT glucose w/out hypoglycemia   Possible hypoexmia from a seizure event, unclear at this time   Troponinemia  Management:  Blood cultures collected in ED, follow  Broad spectrum abx  Troponin elevation likely type 2 MI demand ischemia in setting of cardiac arrest; trend trop to peak, hold off on heparin/aspirin   Continue vasopressor support for MAP >65, wean as able   Urine tox pending  Urine culture pending  As under neuro, EEG, consult neuro regarding supratherapeutic keppra level     PULMONARY:  Dx:  Intubation for acute hypercapnic resp failure   Likely COPD based on imaging   Management:  Currently minimal vent settings, 30% FiO2 5 PEEP  Permissive hypercarbia given recent arrest   No inhalers at home, clear breath sounds, hold off on inhalers/nebs     RENAL/GENITOURINARY:  Dx:  HAGMA  Resp acidosis   Management:  Likely 2/2 lactic acidosis from cardiac arrest   Improving w/ mechanical ventilation, blood pressure support     GASTROENTEROLOGY:  Dx:  Peptic ulcer disease s/p partial gastrectomy ? Year   Management:  Per wife, many years ago  PPI while intubated     ENDOCRINOLOGY:  Dx:  Hypothyroidism  Management:  TSH 6.47 on admit; low suspicion of contribution to  current state; consider supplementation when acute illness resolved     HEMATOLOGY:  Dx:  Normocytic anemia  Management:  Likely 2/2 nutritional status and EtOH  TSH elevated, consider levothyroxine supplement  Vitamin supplements  Daily Cbc    SKIN:  - No active issue     MUSCULOSKELETAL:  Dx:  Diffuse muscle wasting  Management:  Nutrition consult, tube feeds when appropriate     INFECTIOUS DISEASE:  Dx:  Shock, undifferentiated   Concern of patchy consolidation seen on CT PE   Management:  Blood cultures x2 collected  Continue vanc/cefepime/azithromycin   Resp culture, procalcitonin  MRSA nares pending   Urine legionella/strep antigens  Urine culture pending     ICU Check List     FEN  Fluids: PRN, s/p 3L in ED  Electrolytes: PRN  Nutrition: NPO, pending tube feeds  Prophylaxis:  DVT ppx: enoxaparin  GI ppx: protonix IV  Bowel care: miralax  Hardware:                Arterial Line 04/28/25 Right Radial (Active)   Placement Date/Time: 04/28/25 2122   Size: 20 G  Orientation: Right  Location: Radial  Site Prep: Chlorhexidine ;Usual sterile procedure followed  Technique: Anatomical landmarks  Placed by: Dr. Crawford  Insertion attempts: 1  Securement Method: Wallace...   Number of days: 0       ETT  7.5 mm (Active)   Placement Date/Time: 04/28/25 2055   Hand Hygiene Completed: Yes  Technique: Video laryngoscopy  ETT Type: ETT - single  Single Lumen Tube Size: 7.5 mm  Cuffed: Yes  Location: Oral  Airway Insertion Attempts: 1  Placement Verification: Auscultation;Ca...   Number of days: 0       Urethral Catheter Coude 16 Fr. (Active)   Placement Date/Time: 04/28/25 2106   Placed by: Elvira Parada RN  Hand Hygiene Completed: Yes  Catheter Type: Coude  Tube Size (Fr.): 16 Fr.  Catheter Balloon Size: 10 mL  Urine Returned: Yes   Number of days: 0       Social:  Code: Full Code    HPOA: Wife  [phone]  Disposition: TBD    Peyman Bower MD   04/29/25 at 4:26 AM     Disclaimer: Documentation completed with the  information available at the time of input. The times in the chart may not be reflective of actual patient care times, interventions, or procedures. Documentation occurs after the physical care of the patient.            [1] azithromycin, 500 mg, intravenous, q24h  cefepime, 1 g, intravenous, q8h  enoxaparin, 40 mg, subcutaneous, q24h  lactated Ringer's, 500 mL, intravenous, Once  levETIRAcetam, 1,500 mg, intravenous, q12h  thiamine, 500 mg, intravenous, TID   Followed by  [START ON 5/2/2025] thiamine, 100 mg, intravenous, Daily  vancomycin, 1,000 mg, intravenous, Once   And  vancomycin, 500 mg, intravenous, Once     [2] lactated Ringer's, 75 mL/hr, Last Rate: 75 mL/hr (04/29/25 0021)  norepinephrine, 0-0.2 mcg/kg/min, Last Rate: 0.1 mcg/kg/min (04/29/25 0031)  propofol, 0-50 mcg/kg/min, Last Rate: 5 mcg/kg/min (04/29/25 0024)     [3] PRN medications: dextrose, dextrose, glucagon, glucagon, vancomycin

## 2025-04-29 NOTE — ED PROCEDURE NOTE
Procedure  Insert arterial line    Performed by: Gerber Dao DO  Authorized by: Gerber Dao DO    Consent:     Consent obtained:  Emergent situation  Universal protocol:     Required blood products, implants, devices, and special equipment available: yes      Patient identity confirmed:  Anonymous protocol, patient vented/unresponsive and arm band  Indications:     Indications: hemodynamic monitoring    Pre-procedure details:     Skin preparation:  Chlorhexidine    Preparation: Patient was prepped and draped in sterile fashion    Sedation:     Sedation type:  None  Anesthesia:     Anesthesia method:  None  Procedure details:     Location:  R radial    Needle gauge:  18 G    Placement technique:  Ultrasound guided    Number of attempts:  1    Transducer: waveform confirmed    Post-procedure details:     Post-procedure:  Sutured    CMS:  Normal    Procedure completion:  Tolerated               Gerber Dao DO  Resident  04/28/25 [id number]       Gerber Dao DO  Resident  04/28/25 [id number]

## 2025-04-29 NOTE — ED PROVIDER NOTES
"Emergency Department Provider Note        History of Present Illness   History provided by: EMS  Limitations to History: Cardiac Arrest    HPI:  Hector Talley is a 74-year-old male history of epilepsy, PUD/GI bleed, hypertension, nicotine use presented to the ED as a cardiac arrest.  Per EMS reports patient was found by family approximately 3 minutes after they were talking to him, they immediately called EMS, had approximately 5 minutes of downtime prior to fire arriving who initiated CPR.  Patient was in PEA/asystole and had no shockable rhythms, received 2 epis prior to ED arrival and had approximately 20 minutes of CPR undergoing with no ROSC.  Arrived with a right tibial IO, LMA in place being bagged.  Auto compressor attached with compressions ongoing.  Per EMS report some questionable \" renal dysfunction\".    Physical Exam   General: Cachectic, unconscious, nonresponsive to noxious stimuli  Eyes: Pupils fixed and dilated  HENT: Normo-cephalic, atraumatic.  CV: Chest Compressions via autopulse in progress, femoral pulse palpable with compressions  Resp: BVM with symmetric breath sounds bilaterally via LMA  GI: Soft, no bruising  MSK/Extremities: No gross bony deformities.  Skin: Dry  Neuro: GCS 3. Patient is unresponsive with no spontaneous movements.    Medical Decision Making & ED Course   Medical Decision Makin y.o. male presenting to the ED in cardiac arrest.  ACLS with supervision of CPR was continued here.  Per reports patient had approximately 3 to 5 minutes of downtime prior to fire arrival, they continued CPR for an additional 5 minutes until EMS arrived, approximately 20 to 25 minutes of CPR underway prior to ED arrival, patient has been in PEA/asystole with no shockable rhythms.  He arrived with a right tibial IO and being bagged with an LMA.  Received 2 rounds of epinephrine prior to arrival.  Upon arrival here patient was transitioned over, CPR continued, patient received 2 additional doses " "of epinephrine and 2 additional rounds of CPR, additionally was given 1 dose of calcium gluconate but this questionable history of \" renal dysfunction\" with consideration of hyperkalemia.  Patient is cachectic, malnourished appearing.  On second pulse check ROSC was obtained and patient was noted to have sinus tachycardia on the monitor.  IV access was obtained and a blood gas revealed a mixed metabolic respiratory acidosis.  There was no evidence of hyperkalemia on blood gas.  Patient was given IV fluid bolus and initiated on levo for BP support.  Once access was obtained patient was intubated using rocuronium and etomidate.  Cooling was initiated via Arctic sun and ice bags for targeted temperature management less than 34 cc.  Patient was given temperature-sensing esophageal probe, Rich.  DDx at this time is broad, considered cardiac source of arrest, additionally considered PE given PEA arrest and will get CT PE, bedside POCUS limited but patient had no clear evidence of right heart strain, no large pericardial effusion, concentric squeeze.  Additionally given patient's cachexia consider malnourishment, dehydration, electrolyte derangements and will get labs as well as CTAP.  Given that patient had an arrest with concern for head strike we will get CT head to rule out ICH as well as prognostication post-arrest.  Confirmed patient remains full code after discussion with family.  Patient currently on levo for BP support, received 2 L IV fluids and will initiate on maintenance fluids.  Will treat accordingly pending results of CT imaging, patient accepted to the ICU pending bed availability.    ED Course:  ED Course as of 04/28/25 2300   Mon Apr 28, 2025 2104 ECG 12 Lead  ECG showing sinus tachycardia rate 103, normal axis.  ST depressions with inverted T waves in V4 through V6, no acute ST segment elevation.  , , Qtc 479.  [KR]   2136 Spoke with patient's daughters and wife, he reportedly was his " typical self today when he started falling back.  They heard a thud and daughter responded and started CPR while family called EMS.  He was not complaining anything preceding his arrest.  They do note over the last month the patient has stopped eating and drinking and has lost a significant amount of weight.  Otherwise do not know any changes in his medical condition. [KR]   2142 XR chest 1 view  Chest x-ray showing endotracheal tube, lungs hyperinflated with flattening of the diaphragms concerning for chronic lung disease.  Patient is moving air equally and is on ventilator. [KR]   2148 ATTENDING ATTESTATION  Level 1 Critical   74-year-old male history of hypertension, EMS and family serving as surrogate history reportedly with poor appetite and extreme weight loss over the past couple of weeks sustaining a witnessed cardiac arrest by family, family member started CPR relatively rapidly, EMS notes 20-minute total downtime with CPR PEA the entire time never shockable rhythm.  On arrival patient obtunded with Igel in place CPR in progress after short time of ACLS ROSC was achieved.  Please see code sheet for full details  Patient was subsequently successfully intubated via video laryngoscopy single pass confirmed by capnography and chest x-ray, multiple points of IV access established she was started on norepinephrine postarrest postarrest EKG with no sign of acute ischemic change or STEMI.  Chest x-ray confirming tube placement with no obvious pneumothorax.  Bedside cardiac ultrasound with no obvious effusion, no obvious focal wall motion abnormalities appears to be relatively normal cardiac function.  There was no prodromal chest pain or trouble breathing according to family.  He did pass out and was assisted to the ground before it was found that he had no pulse, there is no obvious sustained trauma we will obtain a CT of the head for prognostication postarrest as well as CT chest abdomen and pelvis to evaluate  for potential intrathoracic or interabdominal pathology, treat as indicated based off results.  Patient was started on targeted temperature management, ice packs placed in the groin and the axilla with successful core temperature target achieved below 34 he was started on Arctic sun.  Patient accepted to the medical ICU critical condition.  Family has been updated  [facility] [RH]   2228 XR abdomen 1 view  OGT at the gastroesophageal junction, will recommend advancement. [KR]      ED Course User Index  [KR] Diana Lima DO  [RH] Chaz Mitchell DO         Diagnoses as of 04/28/25 2300   Cardiac arrest       Disposition   As a result of their workup, the patient will require admission to the hospital.  The patient was informed of his diagnosis.  The patient was given the opportunity to ask questions and I answered them. The patient agreed to be admitted to the hospital.    Procedures   Procedures        Diana Lima DO  Emergency Medicine      Diana Lima DO  Resident  04/28/25 2300

## 2025-04-29 NOTE — PROGRESS NOTES
SOCIAL WORK NOTE   -ICU Treatment Plan: SW met with team for interdisciplinary rounds.  Patient presents post arrest, pending neuro prognostication   -Support System: wife  -Planned Disposition: TBD, PT/OT evaluation ongoing    -Additional information:  Social work to follow.   EDEN Mendoza, LISW-S ([id number])

## 2025-04-29 NOTE — ED PROCEDURE NOTE
Procedure  Intubation    Performed by: Emmanuel Rogers DO  Authorized by: Emmanuel Rogers DO    Consent:     Consent obtained:  Emergent situation  Universal protocol:     Patient identity confirmed:  Arm band  Pre-procedure details:     Indications: cardio/pulmonary arrest      Patient status:  Unresponsive    Look externally: no concerns      Mouth opening - incisor distance:  3 or more finger widths    Hyoid-mental distance: 3 or more finger widths      Hyoid-thyroid distance: 2 or more finger widths      Mallampati score:  I    Obstruction: none      Neck mobility: normal      Pharmacologic strategy: RSI      Induction agents:  Etomidate    Paralytics:  Rocuronium  Procedure details:     Preoxygenation:  Bag valve mask    CPR in progress: no      Number of attempts:  1  Successful intubation attempt details:     Intubation method:  Oral    Intubation technique: video assisted      Laryngoscope blade:  Hypercurved    Grade view: I      Tube size (mm):  7.5    Tube type:  Cuffed    Tube visualized through cords: yes    Placement assessment:     ETT at teeth/gumline (cm):  23    Tube secured with:  ETT shahid    Breath sounds:  Equal    Placement verification: chest rise, colorimetric ETCO2, CXR verification, direct visualization and equal breath sounds      CXR findings:  Appropriate position  Post-procedure details:     Procedure completion:  Tolerated               Emmanuel Rogers DO  Resident  04/29/25 1357       Emmanuel Rogers DO  Resident  04/29/25 1353

## 2025-04-29 NOTE — CONSULTS
"Nutrition Initial Assessment:   Nutrition Assessment    Reason for Assessment: Enteral assessment/recommendation (TF)    Patient is a 74 y.o. male presenting with cardiac arrest and shock.  He is intubated, not sedated, on levo and vaso for pressure support.  Also on EEG monitoring and having his magnesium and potassium repleted.      Pt has an NGT in place for enteral access.  Per RN, report to him was that pt misplaced/lost his denture a few weeks ago and has not been eating well since that time with a 20# weight loss.     Past medical history includes HTN, EtOH, pre-DM, focal epilepsy       Anthropometrics:  Height: 175.3 cm (5' 9\")   Weight: 47 kg (103 lb 9.9 oz)   BMI (Calculated): 15.29  IBW/kg (Dietitian Calculated): 72.7 kg  Percent of IBW: 65 %     Weight History:     Wt Readings from Last 5 Encounters:   04/29/25 47 kg (103 lb 9.9 oz)     No weights in EMR.    Weight Change %:       Nutrition Focused Physical Exam Findings:    Subcutaneous Fat Loss:   Orbital Fat Pads: Mild-Moderate (slight dark circles and slight hollowing)  Buccal Fat Pads: Mild-Moderate (flat cheeks, minimal bounce)  Triceps: Severe (negligible fat tissue)  Muscle Wasting:  Temporalis: Severe (hollowed scooping depression)  Pectoralis (Clavicular Region): Severe (protruding prominent clavicle)  Deltoid/Trapezius: Severe (squared shoulders, acromion process prominent)  Quadriceps: Severe (depressions on inner and outer thigh)  Gastrocnemius: Severe (minimal muscle definition)  Edema:  Edema: none  Physical Findings:  Skin: Negative    Nutrition Significant Labs:  A1C:  Lab Results   Component Value Date    HGBA1C 5.2 07/27/2023   , BG POCT trend:   Results from last 7 days   Lab Units 04/29/25  0844 04/29/25  0235 04/28/25 2027   POCT GLUCOSE mg/dL 292* 135* 114*    , Renal Lab Trend:   Results from last 7 days   Lab Units 04/29/25  0355 04/28/25 2054   POTASSIUM mmol/L 3.1* 4.2   PHOSPHORUS mg/dL 2.8 6.9*   SODIUM mmol/L 135* 140 " "  MAGNESIUM mg/dL 1.62 2.33   EGFR mL/min/1.73m*2 >90 89   BUN mg/dL 20 18   CREATININE mg/dL 0.68 0.89    , Vit D: No results found for: \"VITD25\"     Nutrition Specific Medications:  Scheduled medications  cefepime, 1 g, intravenous, q8h  enoxaparin, 40 mg, subcutaneous, q24h  lactated Ringer's, 1,000 mL, intravenous, Once  levETIRAcetam, 750 mg, intravenous, q12h  pantoprazole, 40 mg, intravenous, Daily  sodium bicarbonate, , ,   thiamine, 500 mg, intravenous, TID   Followed by  [START ON 5/2/2025] thiamine, 100 mg, intravenous, Daily  [START ON 4/30/2025] vancomycin, 1,250 mg, intravenous, q24h      Continuous medications  lactated Ringer's, 75 mL/hr, Last Rate: 75 mL/hr (04/29/25 0720)  norepinephrine, 0-0.2 mcg/kg/min, Last Rate: 0.06 mcg/kg/min (04/29/25 0930)  propofol, 0-50 mcg/kg/min, Last Rate: Stopped (04/29/25 0300)  vasopressin, 0.03 Units/min, Last Rate: 0.03 Units/min (04/29/25 0900)      PRN medications  PRN Medications[1]     I/O:    ;        Dietary Orders (From admission, onward)       Start     Ordered    04/29/25 0616  May Not Participate in Room Service  ( ROOM SERVICE MAY NOT PARTICIPATE)  Once        Question:  .  Answer:  Yes    04/29/25 0615    04/29/25 0320  NPO Diet; Effective now  Diet effective now         04/29/25 0319                     Estimated Needs:   Total Energy Estimated Needs in 24 hours (kCal):  (7671-6243)  Method for Estimating Needs: MV= 6.9, RMR= 1257  Total Protein Estimated Needs in 24 Hours (g): 70 g  Method for Estimating 24 Hour Protein Needs: 1.5 x 47kg            Nutrition Diagnosis   Malnutrition Diagnosis  Patient has Malnutrition Diagnosis: Yes  Diagnosis Status: New  Malnutrition Diagnosis: Severe malnutrition related to acute disease or injury  As Evidenced by: reports of poor PO intake for the last few weeks with subjective weight loss of 20# and severe fat and muscle wasting on physical exam          Nutrition Interventions/Recommendations         " Nutrition Prescription:   For tube feed: Two Greg HN at start rate of 15mls/hr.  Increase after 8hrs to goal rate of 25mls/hr reached.  Also order one packet of Pro-Stat AWC once daily.  Water flushes per team's discretion-- goal rate provides 420mls free water daily.   Pt is a refeeding risk.  He is already on 500mgs thiamin.  Would then order 100mgs thiamin once daily.    Continue to check RFP with mag BID.  Check Vitamin D level.          Nutrition Interventions:    TF at goal with prostat= 1300kcals, 67grams protein       Nutrition Education:   Not appropriate       Nutrition Monitoring and Evaluation   Food/Nutrient Related History Monitoring  Monitoring and Evaluation Plan: Enteral and parenteral nutrition intake determination  Enteral and Parenteral Nutrition Intake Determination: Enteral nutrition intake - Tolerate TF at goal rate           Time Spent (min): 60 minutes                 [1] PRN medications: dextrose, dextrose, glucagon, glucagon, sodium bicarbonate, vancomycin

## 2025-04-29 NOTE — CONSULTS
Vancomycin Dosing by Pharmacy- INITIAL    Hector Talley is a 74 y.o. year old male who Pharmacy has been consulted for vancomycin dosing for pneumonia. Based on the patient's indication and renal status this patient will be dosed based on a goal AUC of 400-600.     Renal function is currently stable/improving. AUC dosing.    Visit Vitals  BP (!) 108/44 (Patient Position: Lying)   Pulse 87   Temp (!) 33 °C (91.4 °F) (Esophageal)   Resp 16        Lab Results   Component Value Date    CREATININE 0.68 2025    CREATININE 0.89 2025        Patient weight is as follows:   Vitals:    25 0609   Weight: 47 kg (103 lb 9.9 oz)       Cultures:  No results found for the encounter in last 14 days.        No intake/output data recorded.  I/O during current shift:  I/O this shift:  In: -   Out: 1000 [Urine:1000]    Temp (24hrs), Av.1 °C (91.5 °F), Min:31.9 °C (89.4 °F), Max:35.1 °C (95.2 °F)         Assessment/Plan     Patient has already been given a loading dose of 1500 mg.  Will initiate vancomycin maintenance,  1250 mg every 24 hours.    This dosing regimen is predicted by [employer] to result in the following pharmacokinetic parameters:  <Regimen: 1250 mg IV every 24 hours.  Start time: 06:00 on 2025  Exposure target: AUC24 (range)400-600 mg/L.hr   [id number]: 486 mg/L.hr  AUC24,ss: 482 mg/L.hr  Probability of AUC24 > 400: 70 %  Ctrough,ss: 12.4 mg/L  Probability of Ctrough,ss > 20: 17 %    Follow-up level will be ordered on  at 0500 unless clinically indicated sooner.  Will continue to monitor renal function daily while on vancomycin and order serum creatinine at least every 48 hours if not already ordered.  Follow for continued vancomycin needs, clinical response, and signs/symptoms of toxicity.       Moshe Bonner, PharmD

## 2025-04-29 NOTE — SIGNIFICANT EVENT
"Preliminary EEG Report     This vEEG is consistent with a neck/upper trunk myoclonic status epilepticus.     This EEG was read from 13:17 to 13:40 on 04/29/25 .     The final impression will be available tomorrow under Chart Review in the Media tab. If the plan is to discontinue the video EEG, please place a \"Discontinue Continuous VEEG\" order in the EMR; otherwise the EEG tech will not receive the notification.      Elizabeth Zuniga MD  Adult Epilepsy Fellow  [facility] [place]     "

## 2025-04-29 NOTE — ED TRIAGE NOTES
Pt found down by family approximately 3-5 mins after collapse. Shalom applied. PT given 2 epi PTA. Compressions ongoing upon arrival.

## 2025-04-29 NOTE — ED PROCEDURE NOTE
Procedure  Critical Care    Performed by: Mitchell Bojorquez MD  Authorized by: Mitchell Bojorquez MD    Critical care provider statement:     Critical care time (minutes):  20    Critical care time was exclusive of:  Separately billable procedures and treating other patients and teaching time    Critical care was necessary to treat or prevent imminent or life-threatening deterioration of the following conditions:  Sepsis and shock    Critical care was time spent personally by me on the following activities:  Blood draw for specimens, evaluation of patient's response to treatment, examination of patient, ordering and performing treatments and interventions, ordering and review of laboratory studies, pulse oximetry and re-evaluation of patient's condition  Comments:      Titrating vasopressors, adding antibiotics for sepsis coverage, reevaluating patient condition, troubleshooting blood pressure measurements               Mitchell Bojorquez MD  04/29/25 0612

## 2025-04-29 NOTE — PROGRESS NOTES
"+Medical Intensive Care - Daily Progress Note   Subjective    Hector Talley is a 74 y.o. year old male patient admitted on 4/28/2025 with following ICU needs: post cardiac arrest, mechanical ventilation, shock requiring vasopressors.    Interval History:  Patient received central line for increased pressor requirements and was initiated on vasopressin.  He continues to be unarousable with RASS -5 despite weaning off propofol. Pupils are not reactive to light. Reached out to emergency department to clarify if patient was enrolled in ICECAPS Trial. Per ED, patient is not part of ICECAPS.  Increased temperature goal to 36 °C. Patient's MAP significantly improved after receiving fluid boluses.       Meds    Scheduled medications  Scheduled Medications[1]  Continuous medications  Continuous Medications[2]  PRN medications  PRN Medications[3]     Objective    Blood pressure 109/59, pulse 84, temperature (!) 33 °C (91.4 °F), temperature source Esophageal, resp. rate 16, height 1.753 m (5' 9\"), weight 47 kg (103 lb 9.9 oz), SpO2 100%.     Physical Exam  Constitutional:       Appearance: He is ill-appearing.      Comments: Intubated, RASS -5 not responsive   HENT:      Mouth/Throat:      Mouth: Mucous membranes are dry.   Eyes:      Comments: Pupils not reactive to light, 3 mm in size.    Cardiovascular:      Rate and Rhythm: Normal rate and regular rhythm.      Pulses: Normal pulses.   Pulmonary:      Effort: Pulmonary effort is normal. No respiratory distress.      Breath sounds: No wheezing or rales.      Comments: Mechanical vent sounds, however lungs sound clear  Abdominal:      General: Abdomen is flat. There is no distension.      Tenderness: There is no abdominal tenderness.   Musculoskeletal:      Right lower leg: No edema.      Left lower leg: No edema.   Neurological:      Comments: Not responsive to pain, touch, or sound.            Intake/Output Summary (Last 24 hours) at 4/29/2025 0852  Last data filed at " 4/29/2025 0721  Gross per 24 hour   Intake 1682.09 ml   Output 1225 ml   Net 457.09 ml     Labs:   Results from last 72 hours   Lab Units 04/29/25 0355 04/28/25 2054   SODIUM mmol/L 135* 140   POTASSIUM mmol/L 3.1* 4.2   CHLORIDE mmol/L 100 101   CO2 mmol/L 19* 17*   BUN mg/dL 20 18   CREATININE mg/dL 0.68 0.89   GLUCOSE mg/dL 235* 92   CALCIUM mg/dL 9.0 11.5*   ANION GAP mmol/L 19 26*   EGFR mL/min/1.73m*2 >90 89   PHOSPHORUS mg/dL 2.8 6.9*      Results from last 72 hours   Lab Units 04/29/25 0355 04/28/25 2054   WBC AUTO x10*3/uL 4.3* 4.3*   HEMOGLOBIN g/dL 10.4* 10.5*   HEMATOCRIT % 28.5* 31.1*   PLATELETS AUTO x10*3/uL 139* 160   NEUTROS PCT AUTO % 78.3  --    LYMPHO PCT MAN %  --  21.7   LYMPHS PCT AUTO % 14.1  --    MONO PCT MAN %  --  4.4   MONOS PCT AUTO % 6.7  --    EOSINO PCT MAN %  --  0.0   EOS PCT AUTO % 0.0  --       Results from last 72 hours   Lab Units 04/29/25  0833 04/29/25 0355   POCT PH, ARTERIAL pH 7.53* 7.48*   POCT PCO2, ARTERIAL mm Hg 25* 23*   POCT PO2, ARTERIAL mm Hg 140* 138*   POCT SO2, ARTERIAL % 100 99     Results from last 72 hours   Lab Units 04/29/25  0005 04/28/25 2042   POCT PH, VENOUS pH 7.31* 7.10*   POCT PCO2, VENOUS mm Hg 37* 75*   POCT PO2, VENOUS mm Hg 36 41        Micro/ID:     Lab Results   Component Value Date    BLOODCULT Loaded on Instrument - Culture in progress 04/29/2025         Assessment and Plan     Assessment: Hector Talley is a 74 y.o. year old male patient admitted on 4/28/2025 with pmhx HTN, EtOH use disorder, pre-diabetes, and focal epilepsy presenting post cardiac arrest, mechanical ventilation, and cardiogenic shock vs septic shock requiring pressors.    Mechanical Ventilation: < 4 days  Sedation/Analgesia:  none  Restraints: no    Summary for 04/29/25  :  Patient's blood pressures and MAP significantly responded to fluids. Upon review of patient's past TTE, appears to have significant LVOT obstruction.  Will obtain repeat echo, however, etiology of  cardiac arrest appears to be LVOT exacerbated by  poor nutrition and hydration  Received Central line 4/29 and is currently on Levophed and Vasopressin  Clarified with ED that patient was not enrolled in ICECAPS trial and increased TTM to 36 C  Respiratory culture growing 4+ mixed gram positive and gram negative baciilli.   Based on CT chest imaging and patient's life risk factors including alcohol use, will obtain TB Spot test and AFB cultures   Patient's NG tube needed to be adjusted multiple times, furthermore he has difficulty swallowing per family. Concerned that patient may have oropharyngeal or laryngeal mass, will obtain CT imaging in the future.  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 EEG was significant for burst suppression, which evolve into tonic-clonic seizures every 10 minutes. Full read tomorrow in media tab. Neurology recommended continuing keppra renally dosed and propofol drip with titration to clinical freedom of myoclonus. If refractory may add valproic acid 40mg/kg load or versed with 10 mg bolus.    Assessment:  Hector Talley is a 74 y.o. year old male patient admitted to the MICU on 04/29/25 for shock requiring vasopressors, mechanical ventilation.      Mechanical Ventilation: < 4 days  Sedation/Analgesia:  Propofol  Restraints: Restraints indicated as alternative therapies have been attempted and have been ineffective.  Restrain with soft wrist restraints and side rails up x4 until medical devices discontinued and/or patient able to participate with plan of care.      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 Full read tomorrow in media tab  Consider MRI Brain wo contrast at 72hours and NSU consult for neuro-prognostication      CARDIOVASCULAR:  Dx:  S/p Cardiac arrest   Shock, cardiogenic vs septic  Out of hospital arrest not witnessed by family; s/p ~30 mins of CPR, 4 rounds of Epi prior to ROSC; rhythm PEA/Asystole   Etiology of arrest uncertain; likely hypovolemia given history of poor PO intake over the past few weeks; POCUS without obvious pericardial effusion, CXR and ultrasound without PTX, electrolytes largely stable, minimal oxygenation on vent thus likely not hypoxic; patient was very acidotic on admit, but at that point likely 2/2 arrest, CT PE negative for thrombus, ECG w/out obvious ischemic changes, POCT glucose w/out hypoglycemia   MAP responded well to fluids, upon review of patient's past TTE, appears to have significant LVOT obstruction. Etiology of cardiac arrest appears to be LVOT exacerbated by  poor nutrition and hydration  Troponinemia (down trending)  Management:  Blood cultures collected in ED, follow  Respiratory Culture mixed 4+ Gram positive and Gram negative bacteria  Broad spectrum abx  Troponin elevation likely type 2 MI demand ischemia in setting of cardiac arrest; downtrending  Patient's BP responds significantly to fluids, Continue vasopressor support for MAP >65, wean as able   TTE Ordered  Urine culture pending      PULMONARY:  Dx:  Intubation for acute hypercapnic resp failure   Respiratory alkalosis  Concern for oropharyngeal or laryngeal mass  Management:  Patient's NG tube needed to be adjusted multiple times, furthermore he has  difficulty swallowing per family. Concerned that patient may have oropharyngeal or laryngeal mass, will obtain CT imaging in the future.  Currently minimal vent settings, 30% FiO2 5 PEEP, decreased rate to 12  Will continue to adjust ventilator settings to improve CO2 levels  No inhalers at home, clear breath sounds, hold off on inhalers/nebs   Obtain CT head and neck in future     RENAL/GENITOURINARY:  Dx:  HAGMA(resolved)  Management:  Likely 2/2 lactic acidosis from cardiac arrest   Improving w/ mechanical ventilation, blood pressure support      GASTROENTEROLOGY:  Dx:  Peptic ulcer disease s/p partial gastrectomy   Management:  Per wife, many years ago  PPI while intubated      ENDOCRINOLOGY:  Dx:  Subclinical Hypothyroidism  Management:  TSH 6.47 on admit; lNormal T4     HEMATOLOGY:  Dx:  Normocytic anemia  Management:  Likely 2/2 nutritional status and EtOH  TSH elevated, consider levothyroxine supplement  Vitamin supplements  Daily Cbc     SKIN:  - No active issue      MUSCULOSKELETAL:  Dx:  Diffuse muscle wasting  Management:  Nutrition consult, tube feeds when appropriate      INFECTIOUS DISEASE:  Dx:  Concern for Pneumonia/TB  Concern for UTI  Concern of patchy consolidation seen on CT PE   Respiratory Culture grew 4+ mixed Gram+ and Gram - baccili  Strep. Pneumo, Legionella Negative  Management:  Blood cultures x2 collected  -Concern for TB on imaging, will obtain TB spot test and AFB cultures  Continue vanc/cefepime (4/29-, Discontinued Azithromycin (4/29)  Urine culture pending   ICU Check List       ICU Liberation: Intervention:   Assess, Prevent, Manage Pain CPOT -    Both SAT and SBT [] SAT  [] SBT 30-60 min [] Extubate to NC  [] Extubate to NIV  [] Discuss Trach   Choice of analgesia and sedation RASS: -5  none    Delirium: Assess, prevent and manage CAM -    Early Mobility and Exercise  [] PT /OT consult   Family Engagement and Empowerment [x]Family updated [x]SW consult     FEN  Fluids:  PRN  Electrolytes: PRN  Nutrition: NPO  Prophylaxis:  DVT ppx: Lovenox 40mg daily  GI ppx: PPI 40mg daily  Hardware:         Arterial Line 04/28/25 Right Radial (Active)   Placement Date/Time: 04/28/25 (c) 1759   Size: 18 G  Orientation: Right  Location: Radial  Site Prep: Chlorhexidine   Local Anesthetic: None  Insertion attempts: 1  Securement Method: Sutured   Number of days: 0       ETT  7.5 mm (Active)   Placement Date/Time: 04/28/25 2055   Hand Hygiene Completed: Yes  Technique: Video laryngoscopy  ETT Type: ETT - single  Single Lumen Tube Size: 7.5 mm  Cuffed: Yes  Location: Oral  Airway Insertion Attempts: 1  Placement Verification: Auscultation;Ca...   Number of days: 0       Urethral Catheter Coude 16 Fr. (Active)   Placement Date/Time: 04/28/25 2106   Placed by: Elvira Parada RN  Hand Hygiene Completed: Yes  Catheter Type: Coude  Tube Size (Fr.): 16 Fr.  Catheter Balloon Size: 10 mL  Urine Returned: Yes   Number of days: 0       NG/OG/Feeding Tube NG - [place] sump 16 Fr Right nostril (Active)   Placement Date/Time: 04/29/25 0530   Placed by: MEAGHAN Oliveira RN  Hand Hygiene Completed: Yes  Type of Tube: NG/OG Tube  Tube Length: 60 cm  Tube Type: NG - [place] sump  NG/OG Tube Size: 16 Fr  Tube Location: Right nostril   Number of days: 0       Social:  Code: Full Code    HPOA: Rishi Marsh [phone]   Disposition: ALESSANDRO STRONG TarshaaliciaDO   04/29/25 at 8:52 AM     Disclaimer: Documentation completed with the information available at the time of input. The times in the chart may not be reflective of actual patient care times, interventions, or procedures. Documentation occurs after the physical care of the patient.             [1] azithromycin, 500 mg, intravenous, q24h  cefepime, 1 g, intravenous, q8h  enoxaparin, 40 mg, subcutaneous, q24h  [Held by provider] levETIRAcetam, 1,500 mg, intravenous, q12h  magnesium sulfate, 4 g, intravenous, Once  pantoprazole, 40 mg, intravenous, Daily  potassium chloride,  20 mEq, intravenous, q2h  thiamine, 500 mg, intravenous, TID   Followed by  [START ON 5/2/2025] thiamine, 100 mg, intravenous, Daily  [START ON 4/30/2025] vancomycin, 1,250 mg, intravenous, q24h  vasopressin, , ,   [2] lactated Ringer's, 75 mL/hr, Last Rate: 75 mL/hr (04/29/25 0720)  norepinephrine, 0-0.2 mcg/kg/min, Last Rate: 0.2 mcg/kg/min (04/29/25 0845)  propofol, 0-50 mcg/kg/min, Last Rate: Stopped (04/29/25 0300)  vasopressin, 0.03 Units/min  [3] PRN medications: dextrose, dextrose, glucagon, glucagon, vancomycin, vasopressin

## 2025-04-29 NOTE — PROGRESS NOTES
Pt is a 74 year old male presenting to the ED s/ cardiac arrest. CPR initiated in the field and continued in the trauma bay. ROSC obtained, pt intubated in the trauma bay. Pt's wife and daughter arrived to the ED. Family states pt accidentally threw his dentures away about a month ago and has barely been eating or drinking. Family reports pt has loss 30-40lbs in the las month. Family also states pt used to drink daily (1-2 beers) but has decreased since he lost his denture. Pt's wife provide most of pt's care. Family was updated on pt's care and waiting to be reunited.      Wife Princess Talley [phone]  Daughter Alanis Talley [phone]    Plan: admit    LAVONNE Bearden     Secure Chat  [phone]

## 2025-04-29 NOTE — PROGRESS NOTES
Pharmacy Medication History Review    Hector Talley is a 74 y.o. male admitted for Cardiac arrest. Pharmacy reviewed the patient's nkdxm-[id number] medications and allergies for accuracy.    Medications ADDED:  ALL MEDICATIONS LISTED   Medications CHANGED:  None  Medications REMOVED:   None    The list below reflects the updated PTA list.   Prior to Admission Medications   Prescriptions Last Dose Informant   acetaminophen (Tylenol) 325 mg tablet Past Week Spouse/Significant Other, Other   Sig: Take 2 tablets (650 mg) by mouth every 6 hours if needed for mild pain (1 - 3).   amLODIPine (Norvasc) 10 mg tablet Past Week Spouse/Significant Other, Other   Sig: Take 1 tablet (10 mg) by mouth once daily.   atorvastatin (Lipitor) 80 mg tablet Past Week Spouse/Significant Other, Other   Sig: Take 1 tablet (80 mg) by mouth once daily.   fexofenadine (Allegra) 60 mg tablet Past Week Spouse/Significant Other, Other   Sig: Take 1 tablet (60 mg) by mouth once daily.   folic acid (Folvite) 1 mg tablet Past Week Spouse/Significant Other, Other   Sig: Take 1 tablet (1 mg) by mouth once daily.   levETIRAcetam (Keppra) 750 mg tablet Past Week Spouse/Significant Other, Other   Sig: Take 2 tablets (1,500 mg) by mouth 2 times a day.   losartan (Cozaar) 25 mg tablet Past Week Spouse/Significant Other, Other   Sig: Take 1 tablet (25 mg) by mouth once daily.   naltrexone (Depade) 50 mg tablet Past Week Spouse/Significant Other, Other   Sig: Take 1 tablet (50 mg) by mouth once daily.   thiamine 100 mg tablet Past Week Spouse/Significant Other, Other   Sig: Take 1 tablet (100 mg) by mouth once daily.      Facility-Administered Medications: None        The list below reflects the updated allergy list. Please review each documented allergy for additional clarification and justification.  Allergies  Reviewed by Jade Montanez on 4/28/2025        Severity Reactions Comments    Penicillins Not Specified Other Per Spouse             Patient  "accepts M2B at discharge.     Sources:   [facility] - 04/02/2025 Naltrexone 50 mg tablet 30 # / 30 days   Interview with patients spouse at bedside ( [facility] )   Epic medication dispense hx report   [facility] chart review   CE   [facility] Clinical Summary generated on 04/28/2025     Additional Comments:  Patient unable to be interviewed due to intubation. Patients PTA list has been completed based upon [facility] Clinical Summary generated on 04/28/2025 , all medications listed on the summary were compared to patients most recent medication dispense hx via Epic and then verified with patients spouse at bedside.     Patients spouse is a moderate historian.       Jade Montanez  Pharmacy Technician  04/28/25     Secure Chat preferred   If no response call [phone] or [employer] \"Med Rec\"     "

## 2025-04-29 NOTE — ED PROCEDURE NOTE
Procedure  Critical Care    Performed by: Chaz Mitchell DO  Authorized by: Chaz Mitchell DO    Critical care provider statement:     Critical care time (minutes):  92    Critical care time was exclusive of:  Separately billable procedures and treating other patients and teaching time    Critical care was necessary to treat or prevent imminent or life-threatening deterioration of the following conditions:  Shock, respiratory failure and cardiac failure    Critical care was time spent personally by me on the following activities:  Blood draw for specimens, development of treatment plan with patient or surrogate, discussions with consultants, evaluation of patient's response to treatment, pulse oximetry, re-evaluation of patient's condition, review of old charts, examination of patient, ordering and performing treatments and interventions, ordering and review of laboratory studies, ordering and review of radiographic studies, ventilator management and obtaining history from patient or surrogate               Chaz Mitchell DO  04/28/25 [id number]

## 2025-04-29 NOTE — PROGRESS NOTES
Patient has been identified as having an emergent need for administration of iodinated contrast for CT scan prior to result of laboratory studies OR despite known elevated GFR due to possibility of life and/or limb threatening pathology.    I acknowledge the risks and benefits of emergently proceeding with contrast administration including that, at present, it is the position of the [employer] College [employer] that contrast induced nephropathy (LYNETTE) is a rare but possible consequence. At this time the benefits of proceeding with contrast administration outweigh the risks.    Attempts will be made to mitigate possible LYNETTE risk with IV fluid hydration if able.    Diana Lima, DO  Emergency Medicine PGY-3
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
## 2025-04-30 NOTE — CARE PLAN
Problem: Skin  Goal: Decreased wound size/increased tissue granulation at next dressing change  4/30/2025 1313 by Veronica Melton RN  Outcome: Progressing  Flowsheets (Taken 4/30/2025 1313)  Decreased wound size/increased tissue granulation at next dressing change: Protective dressings over bony prominences  4/30/2025 1312 by Veronica Melton RN  Outcome: Progressing  Goal: Participates in plan/prevention/treatment measures  4/30/2025 1313 by Veronica Melton RN  Outcome: Progressing  Flowsheets (Taken 4/30/2025 1313)  Participates in plan/prevention/treatment measures: Elevate heels  4/30/2025 1312 by Veronica Melton RN  Outcome: Progressing  Goal: Prevent/manage excess moisture  4/30/2025 1313 by Veronica Melton RN  Outcome: Progressing  Flowsheets (Taken 4/30/2025 1313)  Prevent/manage excess moisture:   Cleanse incontinence/protect with barrier cream   Follow provider orders for dressing changes  4/30/2025 1312 by Veronica Melton RN  Outcome: Progressing  Goal: Prevent/minimize sheer/friction injuries  4/30/2025 1313 by Veronica Melton RN  Outcome: Progressing  Flowsheets (Taken 4/30/2025 1313)  Prevent/minimize sheer/friction injuries:   Complete micro-shifts as needed if patient unable. Adjust patient position to relieve pressure points, not a full turn   HOB 30 degrees or less   Turn/reposition every 2 hours/use positioning/transfer devices  4/30/2025 1312 by Veronica Melton RN  Outcome: Progressing  Goal: Promote/optimize nutrition  4/30/2025 1313 by Veronica Melton RN  Outcome: Progressing  Flowsheets (Taken 4/30/2025 1313)  Promote/optimize nutrition: Discuss with provider if NPO > 2 days  4/30/2025 1312 by Veronica Melton RN  Outcome: Progressing  Goal: Promote skin healing  4/30/2025 1313 by Veronica Melton RN  Outcome: Progressing  Flowsheets (Taken 4/30/2025 1313)  Promote skin healing:   Turn/reposition every 2 hours/use positioning/transfer devices   Assess skin/pad under line(s)/device(s)    Protective dressings over bony prominences   Ensure correct size (line/device) and apply per  instructions   Rotate device position/do not position patient on device  4/30/2025 1312 by Veronica Melton RN  Outcome: Progressing     Problem: Knowledge Deficit  Goal: Patient/family/caregiver demonstrates understanding of disease process, treatment plan, medications, and discharge instructions  4/30/2025 1313 by Veronica Melton RN  Outcome: Progressing  4/30/2025 1312 by Veronica Melton RN  Outcome: Progressing     Problem: Mechanical Ventilation  Goal: Patient Will Maintain Patent Airway  4/30/2025 1313 by Veronica Melton RN  Outcome: Progressing  4/30/2025 1312 by Veronica Melton RN  Outcome: Progressing  Goal: Oral health is maintained or improved  4/30/2025 1313 by Veronica Melton RN  Outcome: Progressing  4/30/2025 1312 by Veronica Melton RN  Outcome: Progressing  Goal: Tracheostomy will be managed safely  Outcome: Progressing  Goal: ET tube will be managed safely  4/30/2025 1313 by Veronica Melton RN  Outcome: Progressing  4/30/2025 1312 by Veronica Melotn RN  Outcome: Progressing  Goal: Ability to express needs and understand communication  4/30/2025 1313 by Veronica Melton RN  Outcome: Progressing  4/30/2025 1312 by Veronica Melton RN  Outcome: Progressing  Goal: Mobility/activity is maintained at optimum level for patient  4/30/2025 1313 by Veronica Melton RN  Outcome: Progressing  4/30/2025 1312 by Veronica Melton RN  Outcome: Progressing     Problem: Safety - Medical Restraint  Goal: Remains free of injury from restraints (Restraint for Interference with Medical Device)  Outcome: Met  Goal: Free from restraint(s) (Restraint for Interference with Medical Device)  Outcome: Met

## 2025-04-30 NOTE — HOSPITAL COURSE
75 yo M pmhx HTN, EtOH use disorder, pre-diabetes, focal epilepsy who presented to the ED post out of hospital cardiac arrest. Patient had 3-5 mins of downtime prior to fire squad arrival, who then started CPR and continued 5 mins until EMS arrived. After EMS arrival, patient had 20-25 mins of CPR until patient presented to ED. Per report, rhythm was PEA/asystole, no shocks. Patient received epinephrine x2 via intraosseous line prior to arrival. Rescue breathing via LMA. Upon arrival to the ED, CPR continued, 2 additional doses of epinephrine given w/ 2 rounds of CPR, 1 dose Ca gluconate. On second pulse check in ED, patient noted to have ROSC, sinus tach noted on monitor. VBG at that time showing pH 7.10 / pCO2 75, lactic acid 16.7. Patient given 2L IVF and started on norepinephrine for BP support. After interventions blood gas improved to pH 7.31 / pCO2 37, lactate 5.9  Pt intubated in the ED. CT chest imaging was concerning for patchy infiltrates, ground glass, and tree-in-bud opacities suggestive of pneumonia. Patient was initiated on Vanco and cefepime on 4/29, respiratory culture was taken and grew 4+ Gram positiv and negative bacilli.     On arrival to the MICU, patient had a RASS of -5, despite weaning off sedation.  Pupils were not reactive to light and fixed. He was initiated on TTM. Patient was placed on home Keppra p.o. daily and thiamine/multivitamin due to his history of alcohol use.  Neurology was consulted and EEG was placed.  Findings were consistent with neck/upper trunk. 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 EEG was significant for burst suppression, which evolve into tonic-clonic seizures every 10 minutes. Neurology recommended continuing Keppra and initiating propofol drip with titration until myoclonic jerking stopped.  Patient received a central line on 4/29 and was continued on Levophed and vasopressin.  Blood pressure responded significantly to IV  fluids, and upon review of patient's past TTE he appeared to have significant LVOT obstruction.  Cause of cardiac arrest was also thought to be due to LVOT obstruction being exacerbated by malnutrition and hypovolemia.  Patient had Pulsus Bisferiens noted on arterial telemetry also corroborating diagnosis of dynamic LVOT obstruction.    5/1: Attempted to wean patient off propofol, however patient developed myoclonic jerking once more and was restarted.  Patient continued to not respond to verbal, touch, or toxic stimuli and did not demonstrate gag reflex, cough reflex, pupillary light reflex or corneal reflex.  Patient had been kept on vancomycin and cefepime for consolidation seen on, however respiratory culture grew rashawn and remaining blood and urine cultures were negative, patient was de-escalated to ceftriaxone.  Patient was found to be hyponatremic as well with a sodium of 124 this morning.  Based on urine electrolytes and osmolality, likely due to hypovolemic hyponatremia.  Patient was given additional fluid boluses which helped improve his blood pressure and sodium levels.

## 2025-04-30 NOTE — PROGRESS NOTES
"Medical Intensive Care - Daily Progress Note   Subjective    Hector Talley is a 74 y.o. year old male patient admitted on 4/28/2025 with following ICU needs: post cardiac arrest, mechanical ventilation, shock requiring vasopressor     Interval History: Patient was placed on propofol drip overnight.  This morning, patient continues to be nonresponsive to voice, pain, or touch RASS -5.  Pupils are not reactive to light and appear fixed.  He continues to have occasional myoclonic jerking and as such propofol drip was increased. Levophed requirement have increased to 0.16 and patient remains on vasopressin.  VBG this morning on propofol demonstrated pH 7.39, pCO2 34.  Remains on minimal vent settings, unchanged from yesterday with respiratory rate of 15.    Spoke with patient's family concerning patient's poor prognosis for neurological recovery and likelihood of continual reliance on ventilator.  Informed family that typically we monitor patients for approximately 72 hours for neurological recovery.  Family stated that they will come to a decision regarding patient's wishes and goals of care.  Offered family palliative care consult and possible spiritual care, which family said they will consider.    Meds    Scheduled medications  Scheduled Medications[1]  Continuous medications  Continuous Medications[2]  PRN medications  PRN Medications[3]     Objective    Blood pressure 105/84, pulse 71, temperature 35 °C (95 °F), resp. rate 15, height 1.753 m (5' 9\"), weight 47 kg (103 lb 9.9 oz), SpO2 96%.     Physical Exam   Constitutional:       Appearance: He is ill-appearing.      Comments: Intubated, RASS -5 not responsive on propofol drip.  HENT:      Mouth/Throat:      Mouth: Mucous membranes are dry.   Eyes:      Comments: Pupils not reactive to light, 3 mm in size. Fixed  Cardiovascular:      Rate and Rhythm: Normal rate and regular rhythm.      Pulses: Normal pulses.   Pulmonary:      Effort: Pulmonary effort is normal. " No respiratory distress.      Breath sounds: No wheezing or rales.      Comments: Mechanical vent sounds, however lungs sound clear  Abdominal:      General: Abdomen is flat. There is no distension.      Tenderness: There is no abdominal tenderness.   Musculoskeletal:      Right lower leg: No edema.      Left lower leg: No edema.   Neurological:      Comments: Not responsive to pain, touch, or sound.     Intake/Output Summary (Last 24 hours) at 4/30/2025 1217  Last data filed at 4/30/2025 1209  Gross per 24 hour   Intake 1237.87 ml   Output 195 ml   Net 1042.87 ml     Labs:   Results from last 72 hours   Lab Units 04/30/25  0424 04/29/25  0355 04/28/25 2054   SODIUM mmol/L 127* 135* 140   POTASSIUM mmol/L 4.3 3.1* 4.2   CHLORIDE mmol/L 97* 100 101   CO2 mmol/L 19* 19* 17*   BUN mg/dL 21 20 18   CREATININE mg/dL 0.82 0.68 0.89   GLUCOSE mg/dL 225* 235* 92   CALCIUM mg/dL 8.0* 9.0 11.5*   ANION GAP mmol/L 15 19 26*   EGFR mL/min/1.73m*2 >90 >90 89   PHOSPHORUS mg/dL 2.6 2.8 6.9*      Results from last 72 hours   Lab Units 04/30/25  0424 04/29/25  0355 04/28/25 2054   WBC AUTO x10*3/uL 11.5* 4.3* 4.3*   HEMOGLOBIN g/dL 9.0* 10.4* 10.5*   HEMATOCRIT % 24.2* 28.5* 31.1*   PLATELETS AUTO x10*3/uL 115* 139* 160   NEUTROS PCT AUTO %  --  78.3  --    LYMPHO PCT MAN % 6.2  --  21.7   LYMPHS PCT AUTO %  --  14.1  --    MONO PCT MAN % 0.8  --  4.4   MONOS PCT AUTO %  --  6.7  --    EOSINO PCT MAN % 0.0  --  0.0   EOS PCT AUTO %  --  0.0  --       Results from last 72 hours   Lab Units 04/30/25  0846 04/29/25  1220 04/29/25  0833   POCT PH, ARTERIAL pH 7.53* 7.50* 7.53*   POCT PCO2, ARTERIAL mm Hg 22* 25* 25*   POCT PO2, ARTERIAL mm Hg 146* 145* 140*   POCT SO2, ARTERIAL % 100 100 100     Results from last 72 hours   Lab Units 04/29/25  0005 04/28/25 2042   POCT PH, VENOUS pH 7.31* 7.10*   POCT PCO2, VENOUS mm Hg 37* 75*   POCT PO2, VENOUS mm Hg 36 41        Micro/ID:     Lab Results   Component Value Date    URINECULTURE No  growth 04/29/2025    BLOODCULT No growth at 1 day 04/29/2025       Summary of key imaging results from the last 24 hours  TTE 4/30: Poorly visualized study, overall grossly normal function without obvious regional wall motion abnormalities.  However many segments were not seen.  Unable to determine RV systolic function. Patient has severe mitral annular calcification with mildly elevated RVSP, moderate to severe tricuspid regurg visualized    Assessment and Plan     Assessment: Hector Talley is a 74 y.o. year old male patient admitted on 4/28/2025 with  pmhx HTN, EtOH use disorder, pre-diabetes, and focal epilepsy presenting post cardiac arrest, mechanical ventilation, and cardiogenic shock vs septic shock requiring pressors.     Mechanical Ventilation: < 4 days  Sedation/Analgesia:  Propofol  Restraints: no    Summary for 04/30/25  :  Patient continues to have negative pupils reflex with fixed pupils and RASS -5, unchanged from yesterday. He was initiated on propofol drip overnight. Increased propofol drip per neurology recommendations. We will hold off on initiating additional antiseizure medications.  Met with patient's family, to discuss patient's poor prognosis for neurological recovery.  Informed patient's family that we typically monitor patients for 72 hours for neurological recovery. Family stated that they will come to a decision regarding patient's wishes and goals of care.  Offered family palliative care consult and possible spiritual care, which family said they will consider.  Patient received additional 1 L LR bolus, will attempt to wean off vasopressors   AFB  smear negative, Tspot pending, remains in airborne isolation.    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   On propofol drip.  Consider MRI Brain wo contrast at 72hours and NSU consult for neuro-prognostication   Met with patient's family, to discuss patient's poor prognosis for neurological recovery.  Informed patient's family that we typically monitor patients for 72 hours for neurological recovery. Family stated that they will come to a decision regarding patient's wishes and goals of care.  Offered family palliative care consult and possible spiritual care, which family said they will consider.    CARDIOVASCULAR:   Dx:  S/p Cardiac arrest   Shock, cardiogenic vs septic  Out of hospital arrest not witnessed by family; s/p ~30 mins of CPR, 4 rounds of Epi prior to ROSC; rhythm PEA/Asystole   Etiology of arrest uncertain; likely hypovolemia given history of poor PO intake over the past few weeks; POCUS without obvious pericardial effusion, CXR and ultrasound without PTX, electrolytes largely stable, minimal oxygenation on vent thus likely not hypoxic; patient was very acidotic on admit, but at that point likely 2/2 arrest, CT PE negative for thrombus, ECG w/out obvious ischemic changes, POCT glucose w/out hypoglycemia   MAP responded well to fluids, upon review of patient's past TTE, appears to have significant LVOT obstruction. Etiology of cardiac arrest appears to be LVOT exacerbated by  poor nutrition and hydration  Troponinemia (down trending)  Blood culture no growth day 1  Management:  Respiratory Culture mixed 4+ Gram positive and Gram negative bacteria, pendng full results  Broad spectrum abx as per ID section  Troponin elevation likely type 2 MI demand ischemia in setting of  cardiac arrest; downtrending  Patient's BP responds significantly to fluids, Continue vasopressor support for MAP >65, wean as able   Urine culture pending  PULMONARY:  Vent Mode: Volume control/assist control  FiO2 (%):  [30 %] 30 %  S RR:  [1-15] 15  S VT:  [450 mL] 450 mL  PEEP/CPAP (cm H2O):  [5 cm H20] 5 cm H20  MAP (cm H2O):  [8-11] 8   Dx:  Intubation for airway support  Respiratory alkalosis  Concern for oropharyngeal or laryngeal mass  Management:  Patient's NG tube needed to be adjusted multiple times, furthermore he has difficulty swallowing per family. Concerned that patient may have oropharyngeal or laryngeal mass, will obtain CT imaging in the future.  Currently minimal vent settings, 30% FiO2 5 PEEP, rate 15  On propofol, VBG with pH 7.39/34  Will continue to adjust ventilator settings to improve CO2 levels  No inhalers at home, clear breath sounds, hold off on inhalers/nebs   Obtain CT head and neck in future     RENAL/GENITOURINARY:  Dx:  HAGMA(resolved)  Management:  Likely 2/2 lactic acidosis from cardiac arrest   Improving w/ mechanical ventilation, blood pressure support      GASTROENTEROLOGY:  Dx:  Peptic ulcer disease s/p partial gastrectomy   Management:  Per wife, many years ago  PPI while intubated      ENDOCRINOLOGY:  Dx:  Subclinical Hypothyroidism  Management:  TSH 6.47 on admit; lNormal T4     HEMATOLOGY:  Dx:  Normocytic anemia  Management:  Likely 2/2 nutritional status and EtOH  TSH elevated, consider levothyroxine supplement  Vitamin supplements  Daily Cbc     SKIN:  - No active issue      MUSCULOSKELETAL:  Dx:  Diffuse muscle wasting  Malnutrition  Management:  Nutrition consult, tube feeds when appropriate      INFECTIOUS DISEASE:  Dx:  Concern for Pneumonia/TB  Concern for UTI  Concern of patchy consolidation seen on CT PE   Respiratory Culture grew 4+ mixed Gram+ and Gram - baccili  Strep. Pneumo, Legionella Negative  Management:  -Concern for TB on imaging, TB spot test pending    AFB smear negative  Continue vanc/cefepime (4/29-, Discontinued Azithromycin (4/29)  Urine culture pending   ICU Check List       ICU Liberation: Intervention:   Assess, Prevent, Manage Pain CPOT - None   Both SAT and SBT [] SAT  [] SBT 30-60 min [] Extubate to NC  [] Extubate to NIV  [] Discuss Trach   Choice of analgesia and sedation RASS: -5  Propofol    Delirium: Assess, prevent and manage CAM -    Early Mobility and Exercise  [] PT /OT consult   Family Engagement and Empowerment [x]Family updated [x]SW consult     FEN  Fluids: PRN  Electrolytes: PRN  Nutrition: NPO  Prophylaxis:  DVT ppx: Lovenox 40mg daily  GI ppx: PPI 40mg daily  Hardware:         Arterial Line 04/28/25 Right Radial (Active)   Placement Date/Time: 04/28/25 (c) 9421   Size: 18 G  Orientation: Right  Location: Radial  Site Prep: Chlorhexidine   Local Anesthetic: None  Insertion attempts: 1  Securement Method: Sutured   Number of days: 1       ETT  7.5 mm (Active)   Placement Date/Time: 04/28/25 2055   Hand Hygiene Completed: Yes  Technique: Video laryngoscopy  ETT Type: ETT - single  Single Lumen Tube Size: 7.5 mm  Cuffed: Yes  Location: Oral  Airway Insertion Attempts: 1  Placement Verification: Auscultation;Ca...   Number of days: 1       Urethral Catheter Coude 16 Fr. (Active)   Placement Date/Time: 04/28/25 2106   Placed by: Elvira Parada RN  Hand Hygiene Completed: Yes  Catheter Type: Coude  Tube Size (Fr.): 16 Fr.  Catheter Balloon Size: 10 mL  Urine Returned: Yes   Number of days: 1       NG/OG/Feeding Tube NG - [place] sump 16 Fr Right nostril (Active)   Placement Date/Time: 04/29/25 0530   Placed by: MEAGHAN Oliveira RN  Hand Hygiene Completed: Yes  Type of Tube: NG/OG Tube  Tube Length: 60 cm  Tube Type: NG - [place] sump  NG/OG Tube Size: 16 Fr  Tube Location: Right nostril   Number of days: 1       Social:  Code: Full Code    HPOA: Rishi Marsh [phone]   Disposition: MICU pending continuing pressure support and intubation for airway  protection. Family to discuss [facility]    Marcus Norman DO   04/30/25 at 12:17 PM     Disclaimer: Documentation completed with the information available at the time of input. The times in the chart may not be reflective of actual patient care times, interventions, or procedures. Documentation occurs after the physical care of the patient.           [1] cefepime, 1 g, intravenous, q12h  enoxaparin, 40 mg, subcutaneous, q24h  insulin lispro, 0-5 Units, subcutaneous, q4h  lactated Ringer's, 1,000 mL, intravenous, Once  levETIRAcetam, 750 mg, intravenous, q12h  pantoprazole, 40 mg, intravenous, Daily  perflutren protein A microsphere, 0.5 mL, intravenous, Once in imaging  sulfur hexafluoride microsphr, 2 mL, intravenous, Once in imaging  thiamine (Vitamin B1) 500 mg in dextrose 5% 100 mL IV, 500 mg, intravenous, q8h  [START ON 5/1/2025] vancomycin, 1,000 mg, intravenous, q24h  [2] norepinephrine, 0-0.2 mcg/kg/min, Last Rate: 0.1 mcg/kg/min (04/30/25 1200)  propofol, 5-50 mcg/kg/min, Last Rate: 20 mcg/kg/min (04/30/25 1200)  vasopressin, 0.03 Units/min, Last Rate: Stopped (04/30/25 1125)  [3] PRN medications: dextrose, dextrose, glucagon, glucagon, vancomycin

## 2025-05-01 NOTE — PROGRESS NOTES
Vancomycin Dosing by Pharmacy- Cessation of Therapy    Consult to pharmacy for vancomycin dosing has been discontinued by the prescriber, pharmacy will sign off at this time.    Please call pharmacy if there are further questions or re-enter a consult if vancomycin is resumed.     Moshe Aguillon, GalloD

## 2025-05-01 NOTE — PROGRESS NOTES
SOCIAL WORK NOTE   -ICU Treatment Plan: Per team updates and reports, poor neurological prognosis. GOC with family,   -Support System: wife   -Planned Disposition: TBD, pending GOC   -Additional information:  Social work to follow.   EDEN Mendoza, LISW-S ([id number])

## 2025-05-01 NOTE — PROGRESS NOTES
"Medical Intensive Care - Daily Progress Note   Subjective    Hector Talley is a 74 y.o. year old male patient admitted on 4/28/2025 with following ICU needs: post cardiac arrest, mechanical ventilation, hypovolemic shock requiring vasopressor support     Interval History:  Overnight patient was initiated on trickle feeds for a few hours.  Patient received 1 L of normal saline bolus for sodium of 127 and decreased blood pressure. Urine output has decreased, overnight patient made approximately 135 mL of urine. Sodium level did not respond to fluid bolus, however patient's blood pressure improved.  This morning patient continues to not be responsive to voice, touch, or toxic stimuli.  Negative pupillary reflex, corneal reflex, and gag reflex.    Meds    Scheduled medications  Scheduled Medications[1]  Continuous medications  Continuous Medications[2]  PRN medications  PRN Medications[3]     Objective    Blood pressure 105/84, pulse 65, temperature 36.1 °C (97 °F), resp. rate 15, height 1.753 m (5' 9\"), weight 47 kg (103 lb 9.9 oz), SpO2 100%.     Physical Exam  Constitutional:       Comments: RASS -5 not responsive to voice, touch, and toxic stimuli.   HENT:      Head: Normocephalic.      Mouth/Throat:      Mouth: Mucous membranes are dry.   Eyes:      Comments: Eyes not reactive to light, pupils are fixed   Cardiovascular:      Rate and Rhythm: Normal rate and regular rhythm.   Pulmonary:      Effort: Pulmonary effort is normal. No respiratory distress.      Breath sounds: No wheezing or rales.      Comments: Mechanical vent sounds  Abdominal:      General: Abdomen is flat. There is no distension.      Palpations: Abdomen is soft.      Tenderness: There is no abdominal tenderness.   Musculoskeletal:      Cervical back: Normal range of motion.      Right lower leg: No edema.      Left lower leg: No edema.   Skin:     General: Skin is warm.      Capillary Refill: Capillary refill takes more than 3 seconds. "   Neurological:      Comments: Negative corneal, gag, cough reflex            Intake/Output Summary (Last 24 hours) at 5/1/2025 1041  Last data filed at 5/1/2025 1014  Gross per 24 hour   Intake 4148.26 ml   Output 305 ml   Net 3843.26 ml     Labs:   Results from last 72 hours   Lab Units 05/01/25  0349 04/30/25  1930 04/30/25  0424   SODIUM mmol/L 124* 127* 127*   POTASSIUM mmol/L 4.4 4.4 4.3   CHLORIDE mmol/L 94* 98 97*   CO2 mmol/L 22 20* 19*   BUN mg/dL 23 22 21   CREATININE mg/dL 1.19 1.06 0.82   GLUCOSE mg/dL 250* 155* 225*   CALCIUM mg/dL 7.1* 7.5* 8.0*   ANION GAP mmol/L 12 13 15   EGFR mL/min/1.73m*2 63 73 >90   PHOSPHORUS mg/dL 3.0 2.8 2.6      Results from last 72 hours   Lab Units 05/01/25  0349 04/30/25  0424 04/29/25  0355   WBC AUTO x10*3/uL 12.4* 11.5* 4.3*   HEMOGLOBIN g/dL 8.4* 9.0* 10.4*   HEMATOCRIT % 22.8* 24.2* 28.5*   PLATELETS AUTO x10*3/uL 92* 115* 139*   NEUTROS PCT AUTO %  --   --  78.3   LYMPHO PCT MAN % 3.1 6.2  --    LYMPHS PCT AUTO %  --   --  14.1   MONO PCT MAN % 1.6 0.8  --    MONOS PCT AUTO %  --   --  6.7   EOSINO PCT MAN % 1.5 0.0  --    EOS PCT AUTO %  --   --  0.0      Results from last 72 hours   Lab Units 04/30/25  0846 04/29/25  1220 04/29/25  0833   POCT PH, ARTERIAL pH 7.53* 7.50* 7.53*   POCT PCO2, ARTERIAL mm Hg 22* 25* 25*   POCT PO2, ARTERIAL mm Hg 146* 145* 140*   POCT SO2, ARTERIAL % 100 100 100     Results from last 72 hours   Lab Units 05/01/25  0348 04/30/25  1213 04/29/25  0005   POCT PH, VENOUS pH 7.44* 7.39 7.31*   POCT PCO2, VENOUS mm Hg 32* 34* 37*   POCT PO2, VENOUS mm Hg 34* 42 36        Micro/ID:     Lab Results   Component Value Date    URINECULTURE No growth 04/29/2025    BLOODCULT No growth at 2 days 04/29/2025           Assessment and Plan     Assessment: Hector Talley is a 74 y.o. year old male patient admitted on 4/28/2025 with pmhx HTN, EtOH use disorder, pre-diabetes, and focal epilepsy presenting post cardiac arrest, mechanical ventilation, and  cardiogenic shock vs septic shock requiring pressors.     Mechanical Ventilation: < 4 days  Sedation/Analgesia:  Propofol  Restraints: no    Summary for 05/01/25  :  Pulsus bisferiens noted on arterial telemetry, indicating dynamic LVOT obstruction. Will give an additional 1 L normal saline bolus this morning alongside albumin 5% 25 g  and recheck electrolytes in the afternoon.  Continue to attempt to wean off vasopressors.  Will stop TTM and attempt to wean patient off of propofol drip while monitoring for myoclonic jerking.  Will reach out to [facility] tomorrow for neuro-prognostication MRI if patient continues to be not responsive neurologically despite titrating down propofol.  Decreased ventilator respiratory rate to 10 patient is not breathing above the vent while on propofol.  Will repeat VBG.  Normal Mairs on respiratory culture, deescalated patient to ceftriaxone.    Plan:  NEUROLOGY/PSYCH:   Dx:  Anoxic Brain Injury w/ Status Myoclonus  Pt w/ history of epilepsy on keppra, hx of seizures 2/2 med noncompliance; wife states has been compliant w/ keppra, no seizure like activity witnessed at time of arrest  Sedation  EtOH use disorder  Keppra level 83  Management:  TTM discontinued at this time  Thiamine supplementation 500mg IV q8hr given history of EtOH  Continue keppra 750 mg IV BID, renally dosed  EEG was placed 4/29, and, upon review by Epilepsy team, findings were consistent with neck/upper trunk myoclonic status epilepticus iso diffuse anoxic brain injury.   On propofol drip. Titrating down while monitoring for myoclonus  Consider MRI Brain wo contrast at 72hours and NSU consult for neuro-prognostication   Met with patient's family on 4/30, to discuss patient's poor prognosis for neurological recovery.  Informed patient's family that we typically monitor patients for 72 hours for neurological recovery. Family stated that they will come to a decision regarding patient's wishes and goals of care.  Offered  family palliative care consult and possible spiritual care, which family said they will consider.     CARDIOVASCULAR:   Dx:  S/p Cardiac arrest   Shock, Hypovolemic  Out of hospital arrest not witnessed by family; s/p ~30 mins of CPR, 4 rounds of Epi prior to ROSC; rhythm PEA/Asystole   Etiology of arrest likely hypovolemia given history of poor PO intake over the past few weeks.  POCUS without obvious pericardial effusion, CXR and ultrasound without PTX, electrolytes largely stable, minimal oxygenation on vent thus likely not hypoxic; patient was very acidotic on admit, but at that point likely 2/2 arrest, CT PE negative for thrombus, ECG w/out obvious ischemic changes, POCT glucose w/out hypoglycemia   MAP responded well to fluids, upon review of patient's past TTE, appears to have significant LVOT obstruction. Etiology of cardiac arrest appears to be LVOT exacerbated by  poor nutrition and hydration  Pulsus bisferiens noted on arterial telemetry, indicating dynamic LVOT obstruction.  Troponinemia (down trending)  Management:  Will give additional 1L fluid bolus and albumin. Monitor BP response, wean fluids as able.  Abx as per ID section  Troponin elevation likely type 2 MI demand ischemia in setting of cardiac arrest; downtrending  Patient's BP responds significantly to fluids, Continue vasopressor support for MAP >65, wean as able     PULMONARY:  Vent Mode: Volume control/assist control  FiO2 (%):  [30 %] 30 %  S RR:  [10-15] 10  S VT:  [450 mL] 450 mL  PEEP/CPAP (cm H2O):  [5 cm H20] 5 cm H20  MAP (cm H2O):  [8-8.8] 8.8   Dx:  Intubation for airway support  Respiratory alkalosis  Concern for oropharyngeal or laryngeal mass  Management:  Patient's NG tube needed to be adjusted multiple times, furthermore he has difficulty swallowing per family. Concerned that patient may have oropharyngeal or laryngeal mass, will obtain CT imaging in the future.  On propofol, weaning down   Will continue to adjust ventilator  settings to improve CO2 levels. Decreased RR to 10. Will repeat VBG  No inhalers at home, clear breath sounds, hold off on inhalers/nebs   Obtain CT head and neck in future     RENAL/GENITOURINARY:  Dx:  HAGMA(resolved)  Management:  Likely 2/2 lactic acidosis from cardiac arrest   Improving w/ mechanical ventilation, blood pressure support      GASTROENTEROLOGY:  Dx:  Peptic ulcer disease s/p partial gastrectomy   Management:  Per wife, many years ago  PPI while intubated      ENDOCRINOLOGY:  Dx:  Subclinical Hypothyroidism  Management:  TSH 6.47 on admit; lNormal T4     HEMATOLOGY:  Dx:  Normocytic anemia  Management:  Likely 2/2 nutritional status and EtOH  TSH elevated, consider levothyroxine supplement  Vitamin supplements  Daily Cbc     SKIN:  - No active issue      MUSCULOSKELETAL:  Dx:  Diffuse muscle wasting  Malnutrition  Management:  Nutrition consult, tube feeds when appropriate      INFECTIOUS DISEASE:  Dx:  Concern for Pneumonia/TB  Concern of patchy consolidation seen on CT PE   Strep. Pneumo, Legionella Negative  Blood culture no growth day 2  Urine Culture negative  Respiratory Culture mixed 4+ Gram positive and Gram negative bacteria, appear to be normal rashawn, pendng full results  AFB negative    Management:  Vanc/cefepime (4/29-5/1), switched to ceftriaxone (5/1- Discontinued Azithromycin (4/29)    ICU Check List       ICU Liberation: Intervention:   Assess, Prevent, Manage Pain CPOT -    Both SAT and SBT [] SAT  [] SBT 30-60 min [] Extubate to NC  [] Extubate to NIV  [] Discuss Trach   Choice of analgesia and sedation RASS: -5  Propofol Wean down propofol, monitor for myoclonic jerking   Delirium: Assess, prevent and manage CAM -    Early Mobility and Exercise  [] PT /OT consult   Family Engagement and Empowerment []Family updated []SW consult     FEN  Fluids: PRN  Electrolytes: PRN  Nutrition: NPO  Prophylaxis:  DVT ppx: subcutaneous Heparin  GI ppx: PPI 40mg daily  Hardware:         Arterial  Line 04/28/25 Right Radial (Active)   Placement Date/Time: 04/28/25 (c) 4798   Size: 18 G  Orientation: Right  Location: Radial  Site Prep: Chlorhexidine   Local Anesthetic: None  Insertion attempts: 1  Securement Method: Sutured   Number of days: 2       ETT  7.5 mm (Active)   Placement Date/Time: 04/28/25 2055   Hand Hygiene Completed: Yes  Technique: Video laryngoscopy  ETT Type: ETT - single  Single Lumen Tube Size: 7.5 mm  Cuffed: Yes  Location: Oral  Airway Insertion Attempts: 1  Placement Verification: Auscultation;Ca...   Number of days: 2       Urethral Catheter Coude 16 Fr. (Active)   Placement Date/Time: 04/28/25 2106   Placed by: Elvira Parada RN  Hand Hygiene Completed: Yes  Catheter Type: Coude  Tube Size (Fr.): 16 Fr.  Catheter Balloon Size: 10 mL  Urine Returned: Yes   Number of days: 2       NG/OG/Feeding Tube NG - Parkman sump 16 Fr Right nostril (Active)   Placement Date/Time: 04/29/25 0530   Placed by: MEAGHAN Oliveira RN  Hand Hygiene Completed: Yes  Type of Tube: NG/OG Tube  Tube Length: 60 cm  Tube Type: NG - Parkman sump  NG/OG Tube Size: 16 Fr  Tube Location: Right nostril   Number of days: 2       Social:  Code: Full Code    HPOA: Rishi Marsh [phone]   Disposition: MICU pending continuing pressure support and intubation for airway protection. Family to discuss [facility]      Marcus NormanDO   05/01/25 at 10:41 AM     Disclaimer: Documentation completed with the information available at the time of input. The times in the chart may not be reflective of actual patient care times, interventions, or procedures. Documentation occurs after the physical care of the patient.             [1] albumin human, 25 g, intravenous, Once  cefTRIAXone, 2 g, intravenous, q24h  heparin (porcine), 5,000 Units, subcutaneous, q8h  insulin lispro, 0-5 Units, subcutaneous, q4h  levETIRAcetam, 750 mg, intravenous, q12h  pantoprazole, 40 mg, intravenous, Daily  perflutren protein A microsphere, 0.5 mL, intravenous, Once  in imaging  sodium chloride, 1,000 mL, intravenous, Once  sulfur hexafluoride microsphr, 2 mL, intravenous, Once in imaging  thiamine (Vitamin B1) 500 mg in dextrose 5% 100 mL IV, 500 mg, intravenous, q8h     [2] norepinephrine, 0-0.2 mcg/kg/min, Last Rate: 0.1 mcg/kg/min (05/01/25 0715)  oxygen,   propofol, 5-50 mcg/kg/min, Last Rate: 20 mcg/kg/min (05/01/25 0700)  vasopressin, 0.03 Units/min, Last Rate: 0.03 Units/min (05/01/25 0700)     [3] PRN medications: dextrose, dextrose, glucagon, glucagon, oxygen

## 2025-05-02 NOTE — CARE PLAN
The patient's goals for the shift include      The clinical goals for the shift include decrease pressor requirement through shift      Problem: Skin  Goal: Decreased wound size/increased tissue granulation at next dressing change  Outcome: Progressing  Flowsheets (Taken 5/2/2025 0014)  Decreased wound size/increased tissue granulation at next dressing change:   Promote sleep for wound healing   Protective dressings over bony prominences   Utilize specialty bed per algorithm  Goal: Participates in plan/prevention/treatment measures  Outcome: Progressing  Flowsheets (Taken 5/2/2025 0014)  Participates in plan/prevention/treatment measures: Elevate heels  Goal: Prevent/manage excess moisture  Outcome: Progressing  Flowsheets (Taken 5/2/2025 0014)  Prevent/manage excess moisture:   Cleanse incontinence/protect with barrier cream   Monitor for/manage infection if present   Follow provider orders for dressing changes   Moisturize dry skin  Goal: Prevent/minimize sheer/friction injuries  Outcome: Progressing  Flowsheets (Taken 5/2/2025 0014)  Prevent/minimize sheer/friction injuries:   Complete micro-shifts as needed if patient unable. Adjust patient position to relieve pressure points, not a full turn   Use pull sheet   HOB 30 degrees or less   Turn/reposition every 2 hours/use positioning/transfer devices   Utilize specialty bed per algorithm  Goal: Promote/optimize nutrition  Outcome: Progressing  Flowsheets (Taken 5/2/2025 0014)  Promote/optimize nutrition:   Discuss with provider if NPO > 2 days   Monitor/record intake including meals  Goal: Promote skin healing  Outcome: Progressing  Flowsheets (Taken 5/2/2025 0014)  Promote skin healing:   Assess skin/pad under line(s)/device(s)   Protective dressings over bony prominences   Turn/reposition every 2 hours/use positioning/transfer devices   Ensure correct size (line/device) and apply per  instructions   Rotate device position/do not position patient on  device     Problem: Knowledge Deficit  Goal: Patient/family/caregiver demonstrates understanding of disease process, treatment plan, medications, and discharge instructions  Outcome: Progressing     Problem: Mechanical Ventilation  Goal: Patient Will Maintain Patent Airway  Outcome: Progressing  Goal: Oral health is maintained or improved  Outcome: Progressing  Goal: ET tube will be managed safely  Outcome: Progressing

## 2025-05-02 NOTE — SIGNIFICANT EVENT
Epilepsy Final Assessment and Plan:  Hector Talley is a 74 y.o. male with PMH of presenting with HTN, EtOH use disorder, pre-diabetes, focal epilepsy who presented to the ED post out of hospital cardiac arrest with 45 mins to ROSC. Epilepsy team consulted for concern fro seizure. Neurological exam significant for comatose exam with intermittent low amplitude jerks. EEG reviewed shows diffuse slowing with bursts correlating with myoclonic jerks. 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        Impression:  Myoclonus iso diffuse anoxic brain injury. Unfortunately, myoclonic status is a malignant pattern considered a sequela of irreversible anoxic brain injury and treatment is focused towards achieving clinical suppression of jerks fro comfort of patient and family.      Recommendations:   - c/w home Keppra 1500mg BID (can adjust to renal function and creatinine clearance)  - Can use propofol infusion (dose per MICU standards) for control of clinical myoclonic jerks  -Please discontinue EEG - place order  - Epilepsy team will sign off  - Consider MRI Brain wo contrast at 72hours and NSU consult for neuro-prognostication     I spent 60 minutes in the professional and overall care of this patient.   Patient staffed with Attending Dr. Aric Friedman MD  Neurology PGY-3   Patient ID: Fernanda Winston is a 71 y.o. female.    Assessment/Plan:    Cervical dystonia  History slowly progressive head tremor over the past 5 years.  Tremor associated with sensation of slight pulling to the left.  Tremor worse when under stress.  On exam she is noted to have slight left torticollis with mild right lateral Tang.  Almost constant diagonal tremor.  Although no sensory tricks were noted by the patient.  When touching the back of her head tremor did not appear to dampen.  She has a history of cervical degenerative disease.    We reviewed treatment options including medications (baclofen, trihexyphenidyl) and neurotoxin injections to muscles of the neck. We discussed the potential benefits and risks of neurotoxin injections . Injections are to treat symptoms.  Potential side effects such as excessive weakness, spread of toxin causing dysphagia, infection and bleeding discussed.    At this time she is not interested in neurotoxin injections.  She did express interest in trying medication.  Will start baclofen 5 mg twice daily every morning and late afternoon.  After week she will increase it to 10 mg twice daily.  If ineffective she will contact the office via [employer] and we can provide instructions for further increase.  If developing side effects we will taper off and consider a trial of trihexyphenidyl.         Diagnoses and all orders for this visit:    Cervical dystonia  -     baclofen 10 mg tablet; 1/2 tab qam and late afternoon , if no improvement after a week increase to 1 tab twice daily    Dystonic tremor  -     Ambulatory Referral to Neurology       Subjective:    Fernanda Winston is a 71 year old woman with HTN, HLD, breast cancer recurrence,  who presents for movement disorder evaluation of head tremor.    Head tremor noted over 5 years ago and slowly has worsened. She does feel her head pull to the left. No clear sensory tricks but states she will place her hand on her left cheek with  elbow on desk when working.  She reports having been told she has cervical degeneration. Previously had neck and shoulder pain which has eased over the year. No current pain. Head tremor worse when anxious.      No tremor of the hand or leges. No vocal tremor. No changes in gait.    She has noted a change in her singing ability but she attributes this to her thyroid. She has a goiter.     She is active. 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 She walks 6 miles.   Maternal cousin and maternal aunt have Parkinson's disease. No other FH of tremors or movement disorder.      MRI cervical spine- 2015-    Reversal of cervical lordosis.   Significant disc space narrowing with anterior osteophytes of C4-C5,   C5-C6 and C6-C7 with some mild spinal canal narrowing and   neuroforaminal narrowing as described above.                  Objective:    Blood pressure 136/70, pulse 60, weight 55.3 kg (122 lb).    Physical Exam  Vitals reviewed.   Eyes:      Extraocular Movements: Extraocular movements intact.      Pupils: Pupils are equal, round, and reactive to light.   Neurological:      Motor: Motor strength is normal.     Deep Tendon Reflexes:      Reflex Scores:       Tricep reflexes are 2+ on the right side and 2+ on the left side.       Bicep reflexes are 2+ on the right side and 2+ on the left side.       Patellar reflexes are 2+ on the right side and 2+ on the left side.  Psychiatric:         Speech: Speech normal.         Neurological Exam  Mental Status   Oriented to person, place, time and situation. Recent and remote memory are intact. Speech is normal. Follows complex commands. Attention and concentration are normal.    Cranial Nerves  CN II: Visual fields full to confrontation. Right funduscopic exam: not visualized. Left funduscopic exam: not visualized.  CN III, IV, VI: Extraocular movements intact bilaterally. Pupils equal round and reactive to light bilaterally.  CN V: Facial sensation is normal.  CN VII: Full and  symmetric facial movement.  CN VIII: Hearing is normal.  CN IX, X: Palate elevates symmetrically  CN XI: Shoulder shrug strength is normal.  CN XII: Tongue midline without atrophy or fasciculations.    Motor  Normal muscle bulk throughout. Normal muscle tone. Mild left torticollis (<10 degree)  Mild constant diagonal head tremor yes- yes to left   No tremor when head turned to right   Present on straight gaze and when turning to left   On exam tremor dose appear to dampen if she touch back of head.  .   Strength is 5/5 throughout all four extremities.    Sensory  Light touch is normal in upper and lower extremities. Vibration is normal in upper and lower extremities.     Reflexes                                            Right                      Left  Biceps                                 2+                         2+  Triceps                                2+                         2+  Patellar                                2+                         2+    Coordination  Right: Finger-to-nose normal. Rapid alternating movement normal.Left: Finger-to-nose normal. Rapid alternating movement normal.  No bradykinesia  No vocal tremor.  No tremor finger-to-nose bilaterally or postural tremor with hands outstretched..    Gait  Casual gait is normal including stance, stride, and arm swing. Able to rise from chair without using arms.        ROS:    Review of Systems   Constitutional:  Negative for appetite change, fatigue and fever.   HENT: Negative.  Negative for hearing loss, tinnitus, trouble swallowing and voice change.    Eyes: Negative.  Negative for photophobia, pain and visual disturbance.   Respiratory: Negative.  Negative for shortness of breath.    Cardiovascular: Negative.  Negative for palpitations.   Gastrointestinal: Negative.  Negative for nausea and vomiting.   Endocrine: Negative.  Negative for cold intolerance.   Genitourinary: Negative.  Negative for dysuria, frequency and urgency.    Musculoskeletal:  Negative for back pain, gait problem, myalgias, neck pain and neck stiffness.   Skin: Negative.  Negative for rash.   Allergic/Immunologic: Negative.    Neurological:  Positive for tremors (Head Tremor). Negative for dizziness, seizures, syncope, facial asymmetry, speech difficulty, weakness, light-headedness, numbness and headaches.   Hematological: Negative.  Does not bruise/bleed easily.   Psychiatric/Behavioral: Negative.  Negative for confusion, hallucinations and sleep disturbance.    All other systems reviewed and are negative.

## 2025-05-02 NOTE — CARE PLAN
Problem: Skin  Goal: Decreased wound size/increased tissue granulation at next dressing change  Outcome: Progressing  Flowsheets (Taken 5/2/2025 0930)  Decreased wound size/increased tissue granulation at next dressing change:   Promote sleep for wound healing   Protective dressings over bony prominences  Goal: Participates in plan/prevention/treatment measures  Outcome: Progressing  Flowsheets (Taken 5/2/2025 0930)  Participates in plan/prevention/treatment measures: Elevate heels  Goal: Prevent/manage excess moisture  Outcome: Progressing  Flowsheets (Taken 5/2/2025 0930)  Prevent/manage excess moisture:   Cleanse incontinence/protect with barrier cream   Moisturize dry skin  Goal: Prevent/minimize sheer/friction injuries  Outcome: Progressing  Flowsheets (Taken 5/2/2025 0930)  Prevent/minimize sheer/friction injuries:   Complete micro-shifts as needed if patient unable. Adjust patient position to relieve pressure points, not a full turn   Use pull sheet   HOB 30 degrees or less   Turn/reposition every 2 hours/use positioning/transfer devices  Goal: Promote/optimize nutrition  Outcome: Progressing  Flowsheets (Taken 5/2/2025 0930)  Promote/optimize nutrition: Discuss with provider if NPO > 2 days  Goal: Promote skin healing  Outcome: Progressing  Flowsheets (Taken 5/2/2025 0930)  Promote skin healing:   Assess skin/pad under line(s)/device(s)   Protective dressings over bony prominences   Turn/reposition every 2 hours/use positioning/transfer devices   Rotate device position/do not position patient on device     Problem: Mechanical Ventilation  Goal: Patient Will Maintain Patent Airway  Outcome: Progressing  Goal: Oral health is maintained or improved  Outcome: Progressing  Goal: ET tube will be managed safely  Outcome: Progressing  Goal: Ability to express needs and understand communication  Outcome: Not Progressing  Goal: Mobility/activity is maintained at optimum level for patient  Outcome: Not Progressing      Problem: Safety - Adult  Goal: Free from fall injury  Outcome: Progressing     Problem: Fall/Injury  Goal: Not fall by end of shift  Outcome: Progressing

## 2025-05-02 NOTE — SIGNIFICANT EVENT
Consented patient's wife by phone for blood products. Phone number listed in patient chart not accurate. Below phone number is where his wife was contacted:     Wife Princess Talley [phone]  Daughter Alanis Talley [phone]    Peyman Bower MD  Internal Medicine PGY-2

## 2025-05-02 NOTE — PROGRESS NOTES
"Medical Intensive Care - Daily Progress Note   Subjective    Hector Talley is a 74 y.o. year old male patient admitted on 4/28/2025 with following ICU needs: mechanical ventilation and vasopressor requirement post cardiac arrest.     Interval History:  Weaned off vasopressin    Meds    Scheduled medications  Scheduled Medications[1]  Continuous medications  Continuous Medications[2]  PRN medications  PRN Medications[3]     Objective    Blood pressure (!) 122/35, pulse 55, temperature 36.4 °C (97.5 °F), temperature source Temporal, resp. rate 12, height 1.753 m (5' 9\"), weight 47 kg (103 lb 9.9 oz), SpO2 99%.     Physical Exam  Constitutional:       Appearance: He is cachectic. He is ill-appearing.      Interventions: He is sedated and intubated.   HENT:      Head: Normocephalic.   Eyes:      Pupils:      Right eye: Pupil is not reactive.      Left eye: Pupil is not reactive.   Cardiovascular:      Rate and Rhythm: Normal rate and regular rhythm.   Pulmonary:      Effort: He is intubated.      Breath sounds: Decreased air movement present.   Abdominal:      General: Abdomen is flat. Bowel sounds are decreased.      Palpations: Abdomen is rigid.   Musculoskeletal:      Right lower leg: No edema.      Left lower leg: No edema.      Comments: Not moving extremities   Skin:     General: Skin is warm and dry.   Neurological:      Mental Status: He is unresponsive.      Comments: No cough/gag/corneal            Intake/Output Summary (Last 24 hours) at 5/2/2025 1047  Last data filed at 5/2/2025 1041  Gross per 24 hour   Intake 2311.61 ml   Output 145 ml   Net 2166.61 ml     Labs:   Results from last 72 hours   Lab Units 05/02/25  0345 05/01/25  1709 05/01/25  1139   SODIUM mmol/L 123* 127* 128*   POTASSIUM mmol/L 4.5 4.7 4.1   CHLORIDE mmol/L 99 100 102   CO2 mmol/L 16* 18* 16*   BUN mg/dL 25* 21 22   CREATININE mg/dL 1.35* 1.24 1.09   GLUCOSE mg/dL 170* 101* 112*   CALCIUM mg/dL 5.9* 6.5* 6.1*   ANION GAP mmol/L 13 14 14 "   EGFR mL/min/1.73m*2 54* 60* 70   PHOSPHORUS mg/dL 4.2 3.8 3.2      Results from last 72 hours   Lab Units 05/02/25  0345 05/01/25  0349 04/30/25  0424   WBC AUTO x10*3/uL 11.5* 12.4* 11.5*   HEMOGLOBIN g/dL 6.7* 8.4* 9.0*   HEMATOCRIT % 18.7* 22.8* 24.2*   PLATELETS AUTO x10*3/uL 57* 92* 115*   NEUTROS PCT AUTO % 87.1  --   --    LYMPHO PCT MAN %  --  3.1 6.2   LYMPHS PCT AUTO % 8.7  --   --    MONO PCT MAN %  --  1.6 0.8   MONOS PCT AUTO % 3.5  --   --    EOSINO PCT MAN %  --  1.5 0.0   EOS PCT AUTO % 0.1  --   --       Results from last 72 hours   Lab Units 05/01/25  1145 04/30/25  0846 04/29/25  1220   POCT PH, ARTERIAL pH 7.42 7.53* 7.50*   POCT PCO2, ARTERIAL mm Hg 25* 22* 25*   POCT PO2, ARTERIAL mm Hg 149* 146* 145*   POCT SO2, ARTERIAL % 100 100 100     Results from last 72 hours   Lab Units 05/01/25  0348 04/30/25  1213   POCT PH, VENOUS pH 7.44* 7.39   POCT PCO2, VENOUS mm Hg 32* 34*   POCT PO2, VENOUS mm Hg 34* 42        Micro/ID:     Lab Results   Component Value Date    URINECULTURE No growth 04/29/2025    BLOODCULT No growth at 3 days 04/29/2025       Summary of key imaging results from the last 24 hours  KUB 5/2  IMPRESSION:  Paucity of bowel gas noted throughout the abdomen which is  nonspecific. If there is persistent clinical concern for obstruction  or other abdominopelvic pathology, consider contrast-enhanced CT.    Assessment and Plan     Assessment: Hector Talley is a 74 y.o. year old male patient admitted on 4/28/2025 with cardiac arrest, c/f anoxic brain injury.    Mechanical Ventilation: < 4 days  Sedation/Analgesia:  Propofol  Restraints: Restraints indicated as alternative therapies have been attempted and have been ineffective.  Restrain with soft wrist restraints and side rails up x4 until medical devices discontinued and/or patient able to participate with plan of care.     Summary for 05/02/25  :  Wean off vasopressin  MRI brain  Correct bicarb  Correct  sodium    Plan:  NEUROLOGY/PSYCH:  Dx:  C/f anoxic vs. Metabolic encephalopathy   Status myoclonus  Management:  Propofol to control myoclonus  Keppra  MRI brain  Holding off on NSU prognostication until metabolic issues corrected    CARDIOVASCULAR:  Dx:  Out of hospital cardiac arrrest  Hypovolemic shock  LVOT obstruction  Management:  Wean pressors for SBP goal of 120    PULMONARY:  Vent Mode: Volume control/assist control  FiO2 (%):  [30 %] 30 %  S RR:  [10] 10  S VT:  [380 mL-450 mL] 380 mL  PEEP/CPAP (cm H2O):  [5 cm H20] 5 cm H20  MAP (cm H2O):  [7-8] 7.7   Dx:  Intubated for airway protection post arrest  No PE on CT chest  Management:  Not appropriate for SBT    RENAL/GENITOURINARY:  Dx:  Hypovolemic hyponatremia  Prerenal AYO  Management:  BID RFP  Strict I/O    GASTROENTEROLOGY:  Dx:  PUD   Hx of partial gastrectomy  Management:  PPI  Holding off on enteral feeding for now    ENDOCRINOLOGY:  Dx:  TSH 6.47  Management:  NTD    HEMATOLOGY:  Dx:  Anemia  Management:  1 unit pRBC  DVT ppx    SKIN:  Dx:  Skin Failure No    Management:  Turn Q2h    MUSCULOSKELETAL:  Dx:  deconditioned  Management:  Not appropriate for PT/OT    INFECTIOUS DISEASE:  Dx:  C/f aspiration pneumonia  Management:  Ceftriaxone (5/1-  )    ICU Check List       ICU Liberation: Intervention:   Assess, Prevent, Manage Pain   NA   Both SAT and SBT [] SAT  [] SBT 30-60 min [] Extubate to NC  [] Extubate to NIV  [] Discuss Trach   Choice of analgesia and sedation Echevarria Agitation Sedation Scale (RASS): Unarousable Propofol  NA   Delirium: Assess, prevent and manage  CAM ICU Positive NA   Early Mobility and Exercise RASS less than -2 [] PT /OT consult   Family Engagement and Empowerment []Family updated [x]SW consult     FEN  Fluids: blood  Electrolytes: replete PRN  Nutrition: NPO  Prophylaxis:  DVT ppx: heparin SQ  GI ppx: PPI  Bowel care: add miralax  Hardware:         CVC 04/29/25 Triple lumen Non-tunneled Right Internal jugular (Active)    Placement Date/Time: 04/29/25 1600   Hand Hygiene Performed Prior to CVC Insertion: Yes  Site Prep: Chlorhexidine   Site Prep Agent has Completely Dried Before Insertion: Yes  All 5 Sterile Barriers Used (Gloves, Gown, Cap, Mask, Large Sterile Drape):...   Number of days: 2       Arterial Line 04/28/25 Right Radial (Active)   Placement Date/Time: 04/28/25 (c) 2358   Size: 18 G  Orientation: Right  Location: Radial  Site Prep: Chlorhexidine   Local Anesthetic: None  Insertion attempts: 1  Securement Method: Sutured   Number of days: 3       ETT  7.5 mm (Active)   Placement Date/Time: 04/28/25 2055   Hand Hygiene Completed: Yes  Technique: Video laryngoscopy  ETT Type: ETT - single  Single Lumen Tube Size: 7.5 mm  Cuffed: Yes  Location: Oral  Airway Insertion Attempts: 1  Placement Verification: Auscultation;Ca...   Number of days: 3       Urethral Catheter Coude 16 Fr. (Active)   Placement Date/Time: 04/28/25 2106   Placed by: Elvira Parada RN  Hand Hygiene Completed: Yes  Catheter Type: Coude  Tube Size (Fr.): 16 Fr.  Catheter Balloon Size: 10 mL  Urine Returned: Yes   Number of days: 3       NG/OG/Feeding Tube NG - [place] sump 16 Fr Right nostril (Active)   Placement Date/Time: 04/29/25 0530   Placed by: MEAGHAN Oliveira RN  Hand Hygiene Completed: Yes  Type of Tube: NG/OG Tube  Tube Length: 60 cm  Tube Type: NG - Pope sump  NG/OG Tube Size: 16 Fr  Tube Location: Right nostril   Number of days: 3       Social:  Code: Full Code    HPOA: Wife Princess Talley [phone]   Daughter Alanis Talley [phone]  Disposition: MICU    Spent 35 minutes critical care time.       Malnutrition Diagnosis Status: New  Malnutrition Diagnosis: Severe malnutrition related to acute disease or injury     As Evidenced by: reports of poor PO intake for the last few weeks with subjective weight loss of 20# and severe fat and muscle wasting on physical exam  I agree with the dietitian's malnutrition diagnosis.        Domi Heath  APRN-CNP   05/02/25 at 10:47 AM     Disclaimer: Documentation completed with the information available at the time of input. The times in the chart may not be reflective of actual patient care times, interventions, or procedures. Documentation occurs after the physical care of the patient.             [1] cefTRIAXone, 2 g, intravenous, q24h  heparin (porcine), 5,000 Units, subcutaneous, q8h  insulin lispro, 0-5 Units, subcutaneous, q4h  levETIRAcetam, 750 mg, intravenous, q12h  pantoprazole, 40 mg, intravenous, Daily  perflutren protein A microsphere, 0.5 mL, intravenous, Once in imaging  sulfur hexafluoride microsphr, 2 mL, intravenous, Once in imaging  thiamine (Vitamin B1) 500 mg in dextrose 5% 100 mL IV, 500 mg, intravenous, q8h  [START ON 5/3/2025] thiamine, 100 mg, intravenous, Daily  [2] norepinephrine, 0-0.2 mcg/kg/min, Last Rate: 0.11 mcg/kg/min (05/02/25 1025)  oxygen,   propofol, 5-50 mcg/kg/min, Last Rate: 10 mcg/kg/min (05/02/25 1021)  vasopressin, 0.015 Units/min, Last Rate: 0.015 Units/min (05/02/25 1018)  [3] PRN medications: dextrose, dextrose, glucagon, glucagon, oxygen

## 2025-05-03 NOTE — PROGRESS NOTES
"Medical Intensive Care - Daily Progress Note   Subjective    Hector Talley is a 74 y.o. year old male patient admitted on 4/28/2025 with following ICU needs: mechanical ventilation and vasopressor requirement post cardiac arrest.     Interval History:  MRI done    Meds    Scheduled medications  Scheduled Medications[1]  Continuous medications  Continuous Medications[2]  PRN medications  PRN Medications[3]     Objective    Blood pressure (!) 103/38, pulse 62, temperature 35.3 °C (95.5 °F), resp. rate 21, height 1.753 m (5' 9\"), weight 56.3 kg (124 lb 1.9 oz), SpO2 100%.     Physical Exam  Constitutional:       Appearance: He is cachectic. He is ill-appearing.      Interventions: He is sedated and intubated.   HENT:      Head: Normocephalic.   Eyes:      Pupils:      Right eye: Pupil is not reactive.      Left eye: Pupil is not reactive.   Cardiovascular:      Rate and Rhythm: Normal rate and regular rhythm.   Pulmonary:      Effort: He is intubated.      Breath sounds: Decreased air movement present.   Abdominal:      General: Abdomen is flat. Bowel sounds are decreased.      Palpations: Abdomen is rigid.   Genitourinary:     Penis: Swelling present.    Musculoskeletal:      Right lower leg: No edema.      Left lower leg: No edema.      Comments: Not moving extremities   Skin:     General: Skin is warm and dry.      Coloration: Skin is mottled.      Comments: Mottled R hand   Neurological:      Mental Status: He is unresponsive.      Comments: No cough/gag/corneal            Intake/Output Summary (Last 24 hours) at 5/3/2025 0843  Last data filed at 5/3/2025 0700  Gross per 24 hour   Intake 2612.95 ml   Output 55 ml   Net 2557.95 ml     Labs:   Results from last 72 hours   Lab Units 05/03/25  0404 05/02/25 2012 05/02/25  0345   SODIUM mmol/L 122* 123* 123*   POTASSIUM mmol/L 4.4 4.6 4.5   CHLORIDE mmol/L 95* 96* 99   CO2 mmol/L 18* 19* 16*   BUN mg/dL 29* 28* 25*   CREATININE mg/dL 1.78* 1.66* 1.35*   GLUCOSE mg/dL " 81 100* 170*   CALCIUM mg/dL 6.9* 7.0* 5.9*   ANION GAP mmol/L 13 13 13   EGFR mL/min/1.73m*2 39* 42* 54*   PHOSPHORUS mg/dL 3.9 4.3 4.2      Results from last 72 hours   Lab Units 05/03/25  0404 05/02/25  1301 05/02/25  0345   WBC AUTO x10*3/uL 12.6* 14.0* 11.5*   HEMOGLOBIN g/dL 11.4* 10.3* 6.7*   HEMATOCRIT % 31.3* 28.7* 18.7*   PLATELETS AUTO x10*3/uL 44* 55* 57*   NEUTROS PCT AUTO %  --  89.5 87.1   LYMPHO PCT MAN % 5.5  --   --    LYMPHS PCT AUTO %  --  5.8 8.7   MONO PCT MAN % 0.0  --   --    MONOS PCT AUTO %  --  3.6 3.5   EOSINO PCT MAN % 0.4  --   --    EOS PCT AUTO %  --  0.1 0.1      Results from last 72 hours   Lab Units 05/02/25 2011 05/02/25  1230 05/01/25  1145   POCT PH, ARTERIAL pH 7.40 7.40 7.42   POCT PCO2, ARTERIAL mm Hg 29* 24* 25*   POCT PO2, ARTERIAL mm Hg 96* 95 149*   POCT SO2, ARTERIAL % 99 99 100     Results from last 72 hours   Lab Units 05/01/25  0348 04/30/25  1213   POCT PH, VENOUS pH 7.44* 7.39   POCT PCO2, VENOUS mm Hg 32* 34*   POCT PO2, VENOUS mm Hg 34* 42        Micro/ID:     Lab Results   Component Value Date    URINECULTURE No growth 04/29/2025    BLOODCULT No growth at 4 days -  FINAL REPORT 04/29/2025       Summary of key imaging results from the last 24 hours  MRI 5/2  IMPRESSION:  1. Multiple punctate foci of restricted diffusion involving the  subcortical white matter of the left temporal, left parietal, and  right parietal lobes. There is also a focus of restricted diffusion  involving the left parietal lobe cortex and right precentral cortical  gyri.  2. Confluent areas of periventricular T2/FLAIR hyperintense signal  along with multiple scattered foci of T2/FLAIR hyperintense signal  scattered throughout the bilateral subcortical white matter. Findings  are nonspecific, however may represent sequelae of small vessel  ischemic changes in the appropriate clinical setting given patient's  age.  3. Moderate diffuse parenchymal volume loss with prominence of  the  ventricular system.  4. Tiny enhancing extra-axial focus along the right anterior frontal  convexity measuring up to 8 mm in greatest dimension likely  meningioma.    Assessment and Plan     Assessment: Hector Talley is a 74 y.o. year old male patient admitted on 4/28/2025 with cardiac arrest, c/f anoxic brain injury.    Mechanical Ventilation: < 4 days  Sedation/Analgesia:  Propofol  Restraints: Restraints indicated as alternative therapies have been attempted and have been ineffective.  Restrain with soft wrist restraints and side rails up x4 until medical devices discontinued and/or patient able to participate with plan of care.     Summary for 05/03/25  :  Neuro prognostication    Plan:  NEUROLOGY/PSYCH:  Dx:  C/f anoxic vs. Metabolic encephalopathy   Status myoclonus  Management:  Propofol to control myoclonus  Keppra  MRI brain complete  Neuro prognostication    CARDIOVASCULAR:  Dx:  Out of hospital cardiac arrrest  Hypovolemic shock  LVOT obstruction  Management:  Wean pressors for MAP of 65    PULMONARY:  Vent Mode: Pressure control/assist control  FiO2 (%):  [30 %] 30 %  S RR:  [10-22] 22  S VT:  [380 mL-450 mL] 380 mL  PEEP/CPAP (cm H2O):  [5 cm H20] 5 cm H20  PIP Set (cm H2O):  [12 cm H2O] 12 cm H2O  MAP (cm H2O):  [6-12] 9.1   Dx:  Intubated for airway protection post arrest  No PE on CT chest  Management:  Not appropriate for SBT    RENAL/GENITOURINARY:  Dx:  Hypovolemic hyponatremia  Prerenal AYO worsening  Management:  BID RFP  Strict I/Os    GASTROENTEROLOGY:  Dx:  PUD   Hx of partial gastrectomy  Management:  PPI  Holding off on enteral feeding for now    ENDOCRINOLOGY:  Dx:  TSH 6.47  Management:  NTD    HEMATOLOGY:  Dx:  Anemia  Management:  Daily CBC  DVT ppx    SKIN:  Dx:  Skin Failure YES  DTI  Management:  Turn Q2h    MUSCULOSKELETAL:  Dx:  deconditioned  Management:  Not appropriate for PT/OT    INFECTIOUS DISEASE:  Dx:  C/f aspiration pneumonia  Management:  Ceftriaxone (5/1- 5/4  )    ICU Check List       ICU Liberation: Intervention:   Assess, Prevent, Manage Pain   NA   Both SAT and SBT [] SAT  [] SBT 30-60 min [] Extubate to NC  [] Extubate to NIV  [] Discuss Trach   Choice of analgesia and sedation Echevarria Agitation Sedation Scale (RASS): Unarousable Propofol  NA   Delirium: Assess, prevent and manage  CAM ICU Positive NA   Early Mobility and Exercise New or increase in vasopressor dose in last 2 hours [] PT /OT consult   Family Engagement and Empowerment []Family updated [x]SW consult     FEN  Fluids: blood  Electrolytes: replete PRN  Nutrition: NPO  Prophylaxis:  DVT ppx: heparin SQ  GI ppx: PPI  Bowel care: add miralax  Hardware:         CVC 04/29/25 Triple lumen Non-tunneled Right Internal jugular (Active)   Placement Date/Time: 04/29/25 1600   Hand Hygiene Performed Prior to CVC Insertion: Yes  Site Prep: Chlorhexidine   Site Prep Agent has Completely Dried Before Insertion: Yes  All 5 Sterile Barriers Used (Gloves, Gown, Cap, Mask, Large Sterile Drape):...   Number of days: 2       Arterial Line 04/28/25 Right Radial (Active)   Placement Date/Time: 04/28/25 (c) 2856   Size: 18 G  Orientation: Right  Location: Radial  Site Prep: Chlorhexidine   Local Anesthetic: None  Insertion attempts: 1  Securement Method: Sutured   Number of days: 3       ETT  7.5 mm (Active)   Placement Date/Time: 04/28/25 2055   Hand Hygiene Completed: Yes  Technique: Video laryngoscopy  ETT Type: ETT - single  Single Lumen Tube Size: 7.5 mm  Cuffed: Yes  Location: Oral  Airway Insertion Attempts: 1  Placement Verification: Auscultation;Ca...   Number of days: 3       Urethral Catheter Coude 16 Fr. (Active)   Placement Date/Time: 04/28/25 2106   Placed by: Elvira Parada RN  Hand Hygiene Completed: Yes  Catheter Type: Coude  Tube Size (Fr.): 16 Fr.  Catheter Balloon Size: 10 mL  Urine Returned: Yes   Number of days: 3       NG/OG/Feeding Tube NG - [place] sump 16 Fr Right nostril (Active)   Placement Date/Time:  04/29/25 0530   Placed by: MEAGHAN Oliveira RN  Hand Hygiene Completed: Yes  Type of Tube: NG/OG Tube  Tube Length: 60 cm  Tube Type: NG - [place] sump  NG/OG Tube Size: 16 Fr  Tube Location: Right nostril   Number of days: 3       Social:  Code: Full Code    HPOA: Wife Princess Talley [phone]   Daughter Alanis Talley [phone]  Disposition: MICU, will discuss with wife and daughter the results of MRI and poor prognosis to determine if comfort measures is appropriate at this time.     Spent 35 minutes critical care time.        Malnutrition Diagnosis Status: New  Malnutrition Diagnosis: Severe malnutrition related to acute disease or injury     As Evidenced by: reports of poor PO intake for the last few weeks with subjective weight loss of 20# and severe fat and muscle wasting on physical exam  I agree with the dietitian's malnutrition diagnosis.        ASHLEY Plascencia-CNP   05/03/25 at 8:43 AM     Disclaimer: Documentation completed with the information available at the time of input. The times in the chart may not be reflective of actual patient care times, interventions, or procedures. Documentation occurs after the physical care of the patient.             [1] cefTRIAXone, 2 g, intravenous, q24h  [Held by provider] heparin (porcine), 5,000 Units, subcutaneous, q8h  insulin lispro, 0-5 Units, subcutaneous, q4h  levETIRAcetam, 750 mg, intravenous, q12h  pantoprazole, 40 mg, intravenous, Daily  perflutren protein A microsphere, 0.5 mL, intravenous, Once in imaging  sulfur hexafluoride microsphr, 2 mL, intravenous, Once in imaging  thiamine, 100 mg, intravenous, Daily     [2] norepinephrine, 0-0.2 mcg/kg/min, Last Rate: 0.18 mcg/kg/min (05/03/25 0700)  oxygen,   propofol, 5-50 mcg/kg/min, Last Rate: 15 mcg/kg/min (05/03/25 0700)     [3] PRN medications: dextrose, dextrose, glucagon, glucagon, oxygen

## 2025-05-03 NOTE — DISCHARGE SUMMARY
Discharge Diagnosis  Cardiac arrest    Issues Requiring Follow-Up  NA    Test Results Pending At Discharge  Pending Labs       Order Current Status    Gray Top Collected (04/29/25 0010)    PST Top Collected (04/29/25 0021)    Extra Tubes In process    Extra Tubes In process    AFB Culture/Smear Preliminary result            Hospital Course  75 yo M pmhx HTN, EtOH use disorder, pre-diabetes, focal epilepsy who presented to the ED post out of hospital cardiac arrest. Patient had 3-5 mins of downtime prior to fire squad arrival, who then started CPR and continued 5 mins until EMS arrived. After EMS arrival, patient had 20-25 mins of CPR until patient presented to ED. Per report, rhythm was PEA/asystole, no shocks. Patient received epinephrine x2 via intraosseous line prior to arrival. Rescue breathing via LMA. Upon arrival to the ED, CPR continued, 2 additional doses of epinephrine given w/ 2 rounds of CPR, 1 dose Ca gluconate. On second pulse check in ED, patient noted to have ROSC, sinus tach noted on monitor. VBG at that time showing pH 7.10 / pCO2 75, lactic acid 16.7. Patient given 2L IVF and started on norepinephrine for BP support. After interventions blood gas improved to pH 7.31 / pCO2 37, lactate 5.9  Pt intubated in the ED. CT chest imaging was concerning for patchy infiltrates, ground glass, and tree-in-bud opacities suggestive of pneumonia. Patient was initiated on Vanco and cefepime on 4/29, respiratory culture was taken and grew 4+ Gram positiv and negative bacilli.     On arrival to the MICU, patient had a RASS of -5, despite weaning off sedation.  Pupils were not reactive to light and fixed. He was initiated on TTM. Patient was placed on home Keppra p.o. daily and thiamine/multivitamin due to his history of alcohol use.  Neurology was consulted and EEG was placed.  Findings were consistent with neck/upper trunk. 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 EEG was  significant for burst suppression, which evolve into tonic-clonic seizures every 10 minutes. Neurology recommended continuing Keppra and initiating propofol drip with titration until myoclonic jerking stopped.  Patient received a central line on  and was continued on Levophed and vasopressin.  Blood pressure responded significantly to IV fluids, and upon review of patient's past TTE he appeared to have significant LVOT obstruction.  Cause of cardiac arrest was also thought to be due to LVOT obstruction being exacerbated by malnutrition and hypovolemia.  Patient had Pulsus Bisferiens noted on arterial telemetry also corroborating diagnosis of dynamic LVOT obstruction.    : Attempted to wean patient off propofol, however patient developed myoclonic jerking once more and was restarted.  Patient continued to not respond to verbal, touch, or toxic stimuli and did not demonstrate gag reflex, cough reflex, pupillary light reflex or corneal reflex.  Patient had been kept on vancomycin and cefepime for consolidation seen on, however respiratory culture grew rashawn and remaining blood and urine cultures were negative, patient was de-escalated to ceftriaxone.  Patient was found to be hyponatremic as well with a sodium of 124 this morning.  Based on urine electrolytes and osmolality, likely due to hypovolemic hyponatremia.  Patient was given additional fluid boluses which helped improve his blood pressure and sodium levels.    MRI done on  showing persistent poor neurological exam after sedation wean; MRI indicates degree of anoxic injury consistent with presentation.  Notified family of prognosis and asked for them to come to bedside.  Pt. Was transitioned to comfort measures only and  5/3 @1520.    Pertinent Physical Exam At Time of Discharge  Physical Exam    Home Medications     Medication List      ASK your doctor about these medications     acetaminophen 325 mg tablet; Commonly known as: Tylenol    amLODIPine 10 mg tablet; Commonly known as: Norvasc   atorvastatin 80 mg tablet; Commonly known as: Lipitor   fexofenadine 60 mg tablet; Commonly known as: Allegra   folic acid 1 mg tablet; Commonly known as: Folvite   levETIRAcetam 750 mg tablet; Commonly known as: Keppra   losartan 25 mg tablet; Commonly known as: Cozaar   naltrexone 50 mg tablet; Commonly known as: Depade   thiamine 100 mg tablet; Commonly known as: Vitamin B-1       Outpatient Follow-Up  No future appointments.    Domi Heath, APRN-CNP

## 2025-05-03 NOTE — CARE PLAN
The patient's goals for the shift include rizwana     The clinical goals for the shift include pt will not require a second vasopressor for support during shift    Over the shift, the patient did not make progress toward the following goals. Barriers to progression include altered mental status. Recommendations to address these barriers include   Problem: Skin  Goal: Decreased wound size/increased tissue granulation at next dressing change  Outcome: Progressing  Flowsheets (Taken 5/2/2025 2103)  Decreased wound size/increased tissue granulation at next dressing change:   Promote sleep for wound healing   Protective dressings over bony prominences  Goal: Participates in plan/prevention/treatment measures  Outcome: Progressing  Flowsheets (Taken 5/2/2025 2103)  Participates in plan/prevention/treatment measures: Elevate heels  Goal: Prevent/manage excess moisture  Outcome: Progressing  Flowsheets (Taken 5/2/2025 2103)  Prevent/manage excess moisture:   Cleanse incontinence/protect with barrier cream   Monitor for/manage infection if present  Goal: Prevent/minimize sheer/friction injuries  Outcome: Progressing  Flowsheets (Taken 5/2/2025 2103)  Prevent/minimize sheer/friction injuries: Turn/reposition every 2 hours/use positioning/transfer devices  Goal: Promote/optimize nutrition  Outcome: Progressing  Flowsheets (Taken 5/2/2025 2103)  Promote/optimize nutrition:   Monitor/record intake including meals   Discuss with provider if NPO > 2 days  Goal: Promote skin healing  Outcome: Progressing  Flowsheets (Taken 5/2/2025 2103)  Promote skin healing:   Turn/reposition every 2 hours/use positioning/transfer devices   Protective dressings over bony prominences   Assess skin/pad under line(s)/device(s)     Problem: Safety - Adult  Goal: Free from fall injury  Outcome: Progressing   .

## 2025-05-03 NOTE — SIGNIFICANT EVENT
Called to see patient for unresponsiveness. On exam the patient did not respond to verbal or physical stimuli. Absent heart and breath sounds for one minute, no response to noxious stimuli, Absent peripheral pulses. Pupils are fixed and dilated. Patient pronounced dead at 15:20. Dr. Berry notified. Next of kin/family notified.

## 2025-05-03 NOTE — ACP (ADVANCE CARE PLANNING)
Confirming Previous Code Status:   Advance Care Planning Note     Discussion Date: 05/03/25   Discussion Participants: spouse and 2 daughters    The patient wishes to discuss Advance Care Planning today and the following is a brief summary of our discussion.     Patient has capacity to make their own medical decisions: No  Health Care Agent/Surrogate Decision Maker documented in chart: Yes    Documents on file and valid:  Advance Directive/Living Will: No   Health Care Power of : No  Other: Wife Princess Talley [phone]  Daughter Alanis Talley [phone]    Communication of Medical Status/Prognosis:   -Patient with persistent poor neurological exam after sedation wean; MRI indicates degree of anoxic injury consistent with presentation  -Neurological prognosis in the setting of these findings is poor; return to limited functional neurological state is very unlikely, and patient will likely require 24 hour care indefinitely        Communication of Treatment Goals/Options:   Wife understands irreversible brain injury and is willing to move forward with comfort measures only today with daughters at bedside.      Treatment Decisions  Goals of Care: comfort is paramount; other goals are not relevant or achievable     Follow Up Plan  Will stop vasopressors and extubate  Comfort medications ordered  Team Members  Domi Acevedo MD  Time Statement: Total face to face time spent on advance care planning was 35 minutes with 25 minutes spent in counseling, including the explanation.    JAMES Plascencia  5/3/2025 2:42 PM

## 2025-05-03 NOTE — CONSULTS
"Reason For Consult  Neuroprognostication    History Of Present Illness  Hector Talley is a 74 y.o. male with h/o HTN, pre-diabetes, alcohol use, focal epilepsy, p/w several weeks progressive weakness and poor PO intake, out of hospital PEA arrest with significant down time (approx 30 mins out of hospital, 2x rounds CPR in-house) w/ ROSC.     Patient with poor neurological status post-arrest, now PAD 5. Propofol held this morning. Hyponatremia to 122, otherwise no major electrolyte abnormalities. MRI obtained yesterday demonstrating scattered bilateral foci of diffusion restriction consisted with anoxic injury.      Past Medical History  He has no past medical history on file.    Surgical History  He has no past surgical history on file.     Social History  He has no history on file for tobacco use, alcohol use, and drug use.    Family History  Family History[1]     Allergies  Penicillins    Review of Systems    Negative except as noted above.     Physical Exam    Intubated  ECNS, OU2R  -corneal, -gag, weak cough  Flaccid x4  Myoclonic jerks ongoing during exam     Last Recorded Vitals  Blood pressure 86/52, pulse 78, temperature 35.5 °C (95.9 °F), resp. rate (!) 27, height 1.753 m (5' 9\"), weight 56.3 kg (124 lb 1.9 oz), SpO2 100%.    Assessment/Plan     Hector Talley is a 74 y.o. male with h/o HTN, pre-diabetes, alcohol use, focal epilepsy, p/w several weeks progressive weakness and poor PO intake, out of hospital PEA arrest.    Recommendations    -Patient with persistent poor neurological exam after sedation wean; MRI indicates degree of anoxic injury consistent with presentation  -Neurological prognosis in the setting of these findings is poor; return to limited functional neurological state is very unlikely, and patient will likely require 24 hour care indefinitely    Patient seen and discussed with attending physician Dr. Lira.    Mark Olson MD  Neurocritical Care Fellow, PGY-5         [1] No " family history on file.

## 2025-05-07 LAB
ACID FAST STN SPEC: NORMAL
MYCOBACTERIUM SPEC CULT: NORMAL

## 2025-05-14 LAB
ACID FAST STN SPEC: NORMAL
MYCOBACTERIUM SPEC CULT: NORMAL

## 2025-05-21 LAB
ACID FAST STN SPEC: NORMAL
MYCOBACTERIUM SPEC CULT: NORMAL

## 2025-05-28 LAB
ACID FAST STN SPEC: NORMAL
MYCOBACTERIUM SPEC CULT: NORMAL

## 2025-06-04 LAB
ACID FAST STN SPEC: NORMAL
MYCOBACTERIUM SPEC CULT: NORMAL

## 2025-06-11 LAB
ACID FAST STN SPEC: NORMAL
MYCOBACTERIUM SPEC CULT: NORMAL

## 2025-06-17 LAB
ACID FAST STN SPEC: NORMAL
MYCOBACTERIUM SPEC CULT: NORMAL